# Patient Record
Sex: FEMALE | Race: WHITE | NOT HISPANIC OR LATINO | URBAN - METROPOLITAN AREA
[De-identification: names, ages, dates, MRNs, and addresses within clinical notes are randomized per-mention and may not be internally consistent; named-entity substitution may affect disease eponyms.]

---

## 2017-01-13 ENCOUNTER — EMERGENCY (EMERGENCY)
Facility: HOSPITAL | Age: 82
LOS: 1 days | Discharge: HOME | End: 2017-01-13
Attending: EMERGENCY MEDICINE | Admitting: EMERGENCY MEDICINE
Payer: MEDICARE

## 2017-01-13 VITALS
HEART RATE: 79 BPM | OXYGEN SATURATION: 99 % | RESPIRATION RATE: 15 BRPM | TEMPERATURE: 98 F | DIASTOLIC BLOOD PRESSURE: 77 MMHG | SYSTOLIC BLOOD PRESSURE: 160 MMHG

## 2017-01-13 LAB
ALBUMIN SERPL ELPH-MCNC: 3.5 G/DL — SIGNIFICANT CHANGE UP (ref 3.3–5)
ALP SERPL-CCNC: 183 U/L — HIGH (ref 40–120)
ALT FLD-CCNC: 17 U/L — SIGNIFICANT CHANGE UP (ref 4–33)
APTT BLD: 31.4 SEC — SIGNIFICANT CHANGE UP (ref 27.5–37.4)
AST SERPL-CCNC: 17 U/L — SIGNIFICANT CHANGE UP (ref 4–32)
BASOPHILS # BLD AUTO: 0.03 K/UL — SIGNIFICANT CHANGE UP (ref 0–0.2)
BASOPHILS NFR BLD AUTO: 0.3 % — SIGNIFICANT CHANGE UP (ref 0–2)
BILIRUB SERPL-MCNC: 0.6 MG/DL — SIGNIFICANT CHANGE UP (ref 0.2–1.2)
BLD GP AB SCN SERPL QL: NEGATIVE — SIGNIFICANT CHANGE UP
BUN SERPL-MCNC: 24 MG/DL — HIGH (ref 7–23)
CALCIUM SERPL-MCNC: 9.2 MG/DL — SIGNIFICANT CHANGE UP (ref 8.4–10.5)
CHLORIDE SERPL-SCNC: 104 MMOL/L — SIGNIFICANT CHANGE UP (ref 98–107)
CO2 SERPL-SCNC: 25 MMOL/L — SIGNIFICANT CHANGE UP (ref 22–31)
CREAT SERPL-MCNC: 0.48 MG/DL — LOW (ref 0.5–1.3)
EOSINOPHIL # BLD AUTO: 0.12 K/UL — SIGNIFICANT CHANGE UP (ref 0–0.5)
EOSINOPHIL NFR BLD AUTO: 1.3 % — SIGNIFICANT CHANGE UP (ref 0–6)
GLUCOSE SERPL-MCNC: 90 MG/DL — SIGNIFICANT CHANGE UP (ref 70–99)
HCT VFR BLD CALC: 37.8 % — SIGNIFICANT CHANGE UP (ref 34.5–45)
HGB BLD-MCNC: 12.5 G/DL — SIGNIFICANT CHANGE UP (ref 11.5–15.5)
IMM GRANULOCYTES NFR BLD AUTO: 0.2 % — SIGNIFICANT CHANGE UP (ref 0–1.5)
INR BLD: 1.1 — SIGNIFICANT CHANGE UP (ref 0.87–1.18)
LYMPHOCYTES # BLD AUTO: 2.19 K/UL — SIGNIFICANT CHANGE UP (ref 1–3.3)
LYMPHOCYTES # BLD AUTO: 22.8 % — SIGNIFICANT CHANGE UP (ref 13–44)
MCHC RBC-ENTMCNC: 30.9 PG — SIGNIFICANT CHANGE UP (ref 27–34)
MCHC RBC-ENTMCNC: 33.1 % — SIGNIFICANT CHANGE UP (ref 32–36)
MCV RBC AUTO: 93.3 FL — SIGNIFICANT CHANGE UP (ref 80–100)
MONOCYTES # BLD AUTO: 0.74 K/UL — SIGNIFICANT CHANGE UP (ref 0–0.9)
MONOCYTES NFR BLD AUTO: 7.7 % — SIGNIFICANT CHANGE UP (ref 2–14)
NEUTROPHILS # BLD AUTO: 6.49 K/UL — SIGNIFICANT CHANGE UP (ref 1.8–7.4)
NEUTROPHILS NFR BLD AUTO: 67.7 % — SIGNIFICANT CHANGE UP (ref 43–77)
PLATELET # BLD AUTO: 368 K/UL — SIGNIFICANT CHANGE UP (ref 150–400)
PMV BLD: 10.1 FL — SIGNIFICANT CHANGE UP (ref 7–13)
POTASSIUM SERPL-MCNC: 3.8 MMOL/L — SIGNIFICANT CHANGE UP (ref 3.5–5.3)
POTASSIUM SERPL-SCNC: 3.8 MMOL/L — SIGNIFICANT CHANGE UP (ref 3.5–5.3)
PROT SERPL-MCNC: 6.2 G/DL — SIGNIFICANT CHANGE UP (ref 6–8.3)
PROTHROM AB SERPL-ACNC: 12.5 SEC — SIGNIFICANT CHANGE UP (ref 10–13.1)
RBC # BLD: 4.05 M/UL — SIGNIFICANT CHANGE UP (ref 3.8–5.2)
RBC # FLD: 13.8 % — SIGNIFICANT CHANGE UP (ref 10.3–14.5)
RH IG SCN BLD-IMP: POSITIVE — SIGNIFICANT CHANGE UP
SODIUM SERPL-SCNC: 144 MMOL/L — SIGNIFICANT CHANGE UP (ref 135–145)
WBC # BLD: 9.59 K/UL — SIGNIFICANT CHANGE UP (ref 3.8–10.5)
WBC # FLD AUTO: 9.59 K/UL — SIGNIFICANT CHANGE UP (ref 3.8–10.5)

## 2017-01-13 PROCEDURE — 71020: CPT | Mod: 26

## 2017-01-13 PROCEDURE — 73552 X-RAY EXAM OF FEMUR 2/>: CPT | Mod: 26,RT

## 2017-01-13 PROCEDURE — 73522 X-RAY EXAM HIPS BI 3-4 VIEWS: CPT | Mod: 26

## 2017-01-13 PROCEDURE — 99285 EMERGENCY DEPT VISIT HI MDM: CPT | Mod: GC

## 2017-01-13 RX ORDER — ONDANSETRON 8 MG/1
4 TABLET, FILM COATED ORAL ONCE
Qty: 0 | Refills: 0 | Status: COMPLETED | OUTPATIENT
Start: 2017-01-13 | End: 2017-01-13

## 2017-01-13 RX ORDER — MORPHINE SULFATE 50 MG/1
2 CAPSULE, EXTENDED RELEASE ORAL ONCE
Qty: 0 | Refills: 0 | Status: DISCONTINUED | OUTPATIENT
Start: 2017-01-13 | End: 2017-01-13

## 2017-01-13 RX ADMIN — MORPHINE SULFATE 2 MILLIGRAM(S): 50 CAPSULE, EXTENDED RELEASE ORAL at 21:49

## 2017-01-13 RX ADMIN — MORPHINE SULFATE 2 MILLIGRAM(S): 50 CAPSULE, EXTENDED RELEASE ORAL at 22:05

## 2017-01-13 NOTE — ED PROVIDER NOTE - OBJECTIVE STATEMENT
89 y/o female w/ hx of dementia presenting from [facility] rehab due to hip pain. Pt is confused and cannot respond to questions or follow commands. Per records sent with patient and discussion with nurse at [facility] (last name Eron) pt had no acute fall or injury and just started complaining of hip pain. An x-ray was done as a result of this pain on 1/12/17 and found a R. femoral neck fracture. Pt was thus sent here for further evaluation. 89 y/o female w/ hx of dementia presenting from [facility] rehab due to hip pain. Pt is confused and cannot respond to questions or follow commands. Per records sent with patient and discussion with nurse at [facility] (last name Eron) pt had no acute fall or injury and just started complaining of hip pain. An x-ray was done as a result of this pain on 1/12/17 and found a R. femoral neck fracture. Pt was thus sent here for further evaluation. [place] nurse confirmed that patient is confused at baseline and that patient was already non-ambulatory before her complaints of pain.

## 2017-01-13 NOTE — ED PROVIDER NOTE - ATTENDING CONTRIBUTION TO CARE
Dr. Castillo: I have personally seen and examined this patient at the bedside. I have fully participated in the care of this patient. I have reviewed all pertinent clinical information, including history, physical exam, plan and the Resident's note and agree except as noted. 89F hx baseline wheelchair bound, htn, glaucoma, sleep disorder, dementia w behavioral disturbance BIBEMS from [facility] [place] suspect hip fracture. During bed check patient had rt hip pain. XR "non union supero laterally displaced subcapital right femoral neck fracture. No other fractures or dislocation. OA both hips. Incidentally noted degenerative disc lumbar spine." Unknown etiology of fracture. Dr. Castillo: I have personally seen and examined this patient at the bedside. I have fully participated in the care of this patient. I have reviewed all pertinent clinical information, including history, physical exam, plan and the Resident's note and agree except as noted. 89F hx baseline wheelchair bound, htn, glaucoma, sleep disorder, dementia w behavioral disturbance BIBEMS from [facility] suspect hip fracture. During bed check patient had rt hip pain. XR "non union supero laterally displaced subcapital right femoral neck fracture. No other fractures or dislocation. OA both hips. Incidentally noted degenerative disc lumbar spine." Unknown etiology of fracture. PE nad, aaox0 (continuous speech alternating multiple languages), ncat neg head or facial trauma, neg hemotympanum, perrl, ctabl, rrr, s1s2, abd soft ntnd, rt hip ttp rt femur and rt knee retract with palpation rle distal pulse intact, lt hip nontender lle non tender lt pulse intact. PLAN ekg cxr xrays rt hip rt femur rt knee, morphine 2 mg ivp premedication for xray, ortho consult suspected rt femoral neck fracture in baseline ambulatory with wheelchair patient

## 2017-01-13 NOTE — ED ADULT TRIAGE NOTE - CHIEF COMPLAINT QUOTE
Pt BIBEMS from [facility] for r/o Hip Fracture/displacement. As per EMS it was unclear if she had a pervious fracture and re-injuried the area.  pmhx Dementia, Glaucoma , pain disorder.

## 2017-01-13 NOTE — ED PROVIDER NOTE - PROGRESS NOTE DETAILS
Pt did not cooperate with necessary positioning for x-rays. Will get CT of pelvis and lower extremities instead. Ortho has seen pt. Believe R. femoral neck fx is old. Currently discussing with attending as to whether or not surgery is warranted. TEO LOPEZ MD: As per ortho patient can be d/c'ed back to SNF and f/u outpt.  Will attempt to contact SNF for return or admit to the hospital for re-placement. TEO LOPEZ MD: S/O from Dr. Castillo.   F/U CT and ortho cx.   As per ortho patient can be d/c'ed back to SNF and f/u outpt.  Will attempt to contact SNF for return or admit to the hospital for re-placement. Discussed with ortho further about pt's rehab. They said pt can be weight bearing as tolerated, but her abilities may be limited given the nature of the fracture. Dr. Lopez was the attending overnight that looked over the patient's case, and the pt may follow up with him in clinic next week for further management discussion. Patient reassessed after call from radiology about possible atelectasis vs infection on cxr. Patient asymptomatic, denies cough, cp or sob. O2 sat 99% on room air. RR normal. lungs clear to auscultation. Clinically no concern for pneumonia. -DL

## 2017-01-13 NOTE — ED PROVIDER NOTE - MEDICAL DECISION MAKING DETAILS
89 y/o w/ hx of dementia presenting w/ R. femoral neck fx.  - Hip x-ray to confirm findings written on report sent with pt.  - Ortho consult once x-ray is complete to determine whether or not further intervention is required.

## 2017-01-13 NOTE — ED ADULT NURSE NOTE - CHIEF COMPLAINT QUOTE
Pt BIBEMS from [facility] for r/o Hip Fracture/displacement. As per EMS it was unclear if she had a pervious fracture and re-injuried the area.  pmhx Dementia, Glaucoma , pain disorder.

## 2017-01-14 VITALS
TEMPERATURE: 98 F | HEART RATE: 84 BPM | DIASTOLIC BLOOD PRESSURE: 70 MMHG | OXYGEN SATURATION: 99 % | SYSTOLIC BLOOD PRESSURE: 148 MMHG | RESPIRATION RATE: 18 BRPM

## 2017-01-14 PROCEDURE — 76376 3D RENDER W/INTRP POSTPROCES: CPT | Mod: 26

## 2017-01-14 PROCEDURE — 73700 CT LOWER EXTREMITY W/O DYE: CPT | Mod: 26,50

## 2017-01-14 PROCEDURE — 72192 CT PELVIS W/O DYE: CPT | Mod: 26

## 2017-01-14 RX ADMIN — ONDANSETRON 4 MILLIGRAM(S): 8 TABLET, FILM COATED ORAL at 00:03

## 2017-01-14 NOTE — ED ADULT NURSE REASSESSMENT NOTE - NS ED NURSE REASSESS COMMENT FT1
pt evaluated by residents and attending/ pt to return to nursing home. arrangements being made by / pt very verbal in foreign language at times/ vss

## 2017-01-18 NOTE — PROVIDER CONTACT NOTE (OTHER) - ASSESSMENT
SW contacted by MD, Pt with Dementia requires ambulance assistance to return to [facility] [phone] SW spoke with RN supervisor Ms. Laird states Pt may return. SW contacted [facility] spoke with Ben who provided confirmation for ambulance by [facility]am . Medical staff are aware and in agreement with plan. No further SW intervention required at this time. Ambulance form in chart for transport.
swk received call from son at [facility] regarding needing info for pt to begin p/t at facility again.  d/c summary resent to snf at F# [phone].

## 2017-01-23 ENCOUNTER — RESULT REVIEW (OUTPATIENT)
Age: 82
End: 2017-01-23

## 2017-01-23 ENCOUNTER — INPATIENT (INPATIENT)
Facility: HOSPITAL | Age: 82
LOS: 6 days | Discharge: NURSING, REHAB OR LONG-TERM CARE | DRG: 470 | End: 2017-01-30
Attending: INTERNAL MEDICINE | Admitting: INTERNAL MEDICINE
Payer: MEDICARE

## 2017-01-23 VITALS
RESPIRATION RATE: 18 BRPM | OXYGEN SATURATION: 95 % | SYSTOLIC BLOOD PRESSURE: 117 MMHG | DIASTOLIC BLOOD PRESSURE: 76 MMHG | TEMPERATURE: 98 F | HEART RATE: 88 BPM | HEIGHT: 62 IN | WEIGHT: 110.01 LBS

## 2017-01-23 DIAGNOSIS — F03.90 UNSPECIFIED DEMENTIA, UNSPECIFIED SEVERITY, WITHOUT BEHAVIORAL DISTURBANCE, PSYCHOTIC DISTURBANCE, MOOD DISTURBANCE, AND ANXIETY: ICD-10-CM

## 2017-01-23 DIAGNOSIS — S72.009A FRACTURE OF UNSPECIFIED PART OF NECK OF UNSPECIFIED FEMUR, INITIAL ENCOUNTER FOR CLOSED FRACTURE: ICD-10-CM

## 2017-01-23 DIAGNOSIS — Z29.9 ENCOUNTER FOR PROPHYLACTIC MEASURES, UNSPECIFIED: ICD-10-CM

## 2017-01-23 LAB
ALBUMIN SERPL ELPH-MCNC: 3.1 G/DL — LOW (ref 3.5–5)
ALP SERPL-CCNC: 223 U/L — HIGH (ref 40–120)
ALT FLD-CCNC: 30 U/L DA — SIGNIFICANT CHANGE UP (ref 10–60)
ANION GAP SERPL CALC-SCNC: 7 MMOL/L — SIGNIFICANT CHANGE UP (ref 5–17)
APTT BLD: 28.3 SEC — SIGNIFICANT CHANGE UP (ref 27.5–37.4)
AST SERPL-CCNC: 16 U/L — SIGNIFICANT CHANGE UP (ref 10–40)
BASOPHILS # BLD AUTO: 0.1 K/UL — SIGNIFICANT CHANGE UP (ref 0–0.2)
BASOPHILS NFR BLD AUTO: 0.7 % — SIGNIFICANT CHANGE UP (ref 0–2)
BILIRUB SERPL-MCNC: 0.4 MG/DL — SIGNIFICANT CHANGE UP (ref 0.2–1.2)
BUN SERPL-MCNC: 31 MG/DL — HIGH (ref 7–18)
CALCIUM SERPL-MCNC: 8.6 MG/DL — SIGNIFICANT CHANGE UP (ref 8.4–10.5)
CHLORIDE SERPL-SCNC: 106 MMOL/L — SIGNIFICANT CHANGE UP (ref 96–108)
CO2 SERPL-SCNC: 31 MMOL/L — SIGNIFICANT CHANGE UP (ref 22–31)
CREAT SERPL-MCNC: 0.73 MG/DL — SIGNIFICANT CHANGE UP (ref 0.5–1.3)
EOSINOPHIL # BLD AUTO: 0.2 K/UL — SIGNIFICANT CHANGE UP (ref 0–0.5)
EOSINOPHIL NFR BLD AUTO: 1.6 % — SIGNIFICANT CHANGE UP (ref 0–6)
GLUCOSE SERPL-MCNC: 90 MG/DL — SIGNIFICANT CHANGE UP (ref 70–99)
HCT VFR BLD CALC: 41.1 % — SIGNIFICANT CHANGE UP (ref 34.5–45)
HGB BLD-MCNC: 13.5 G/DL — SIGNIFICANT CHANGE UP (ref 11.5–15.5)
INR BLD: 1.14 RATIO — SIGNIFICANT CHANGE UP (ref 0.88–1.16)
LYMPHOCYTES # BLD AUTO: 17.5 % — SIGNIFICANT CHANGE UP (ref 13–44)
LYMPHOCYTES # BLD AUTO: 2.1 K/UL — SIGNIFICANT CHANGE UP (ref 1–3.3)
MCHC RBC-ENTMCNC: 31 PG — SIGNIFICANT CHANGE UP (ref 27–34)
MCHC RBC-ENTMCNC: 32.9 GM/DL — SIGNIFICANT CHANGE UP (ref 32–36)
MCV RBC AUTO: 94.1 FL — SIGNIFICANT CHANGE UP (ref 80–100)
MONOCYTES # BLD AUTO: 0.9 K/UL — SIGNIFICANT CHANGE UP (ref 0–0.9)
MONOCYTES NFR BLD AUTO: 7.3 % — SIGNIFICANT CHANGE UP (ref 2–14)
NEUTROPHILS # BLD AUTO: 8.6 K/UL — HIGH (ref 1.8–7.4)
NEUTROPHILS NFR BLD AUTO: 73 % — SIGNIFICANT CHANGE UP (ref 43–77)
PLATELET # BLD AUTO: 396 K/UL — SIGNIFICANT CHANGE UP (ref 150–400)
POTASSIUM SERPL-MCNC: 4.6 MMOL/L — SIGNIFICANT CHANGE UP (ref 3.5–5.3)
POTASSIUM SERPL-SCNC: 4.6 MMOL/L — SIGNIFICANT CHANGE UP (ref 3.5–5.3)
PROT SERPL-MCNC: 6.8 G/DL — SIGNIFICANT CHANGE UP (ref 6–8.3)
PROTHROM AB SERPL-ACNC: 12.8 SEC — SIGNIFICANT CHANGE UP (ref 10–13.1)
RBC # BLD: 4.37 M/UL — SIGNIFICANT CHANGE UP (ref 3.8–5.2)
RBC # FLD: 12.4 % — SIGNIFICANT CHANGE UP (ref 10.3–14.5)
SODIUM SERPL-SCNC: 144 MMOL/L — SIGNIFICANT CHANGE UP (ref 135–145)
WBC # BLD: 11.8 K/UL — HIGH (ref 3.8–10.5)
WBC # FLD AUTO: 11.8 K/UL — HIGH (ref 3.8–10.5)

## 2017-01-23 PROCEDURE — 70450 CT HEAD/BRAIN W/O DYE: CPT | Mod: 26

## 2017-01-23 PROCEDURE — 73522 X-RAY EXAM HIPS BI 3-4 VIEWS: CPT | Mod: 26

## 2017-01-23 PROCEDURE — 71010: CPT | Mod: 26

## 2017-01-23 PROCEDURE — 99285 EMERGENCY DEPT VISIT HI MDM: CPT

## 2017-01-23 RX ORDER — ACETAMINOPHEN 500 MG
650 TABLET ORAL ONCE
Qty: 0 | Refills: 0 | Status: DISCONTINUED | OUTPATIENT
Start: 2017-01-23 | End: 2017-01-23

## 2017-01-23 RX ORDER — LATANOPROST 0.05 MG/ML
1 SOLUTION/ DROPS OPHTHALMIC; TOPICAL AT BEDTIME
Qty: 0 | Refills: 0 | Status: DISCONTINUED | OUTPATIENT
Start: 2017-01-23 | End: 2017-01-24

## 2017-01-23 RX ORDER — ACETAMINOPHEN 500 MG
650 TABLET ORAL EVERY 6 HOURS
Qty: 0 | Refills: 0 | Status: DISCONTINUED | OUTPATIENT
Start: 2017-01-23 | End: 2017-01-24

## 2017-01-23 RX ORDER — MIRTAZAPINE 45 MG/1
15 TABLET, ORALLY DISINTEGRATING ORAL AT BEDTIME
Qty: 0 | Refills: 0 | Status: DISCONTINUED | OUTPATIENT
Start: 2017-01-23 | End: 2017-01-24

## 2017-01-23 RX ORDER — HEPARIN SODIUM 5000 [USP'U]/ML
5000 INJECTION INTRAVENOUS; SUBCUTANEOUS EVERY 12 HOURS
Qty: 0 | Refills: 0 | Status: DISCONTINUED | OUTPATIENT
Start: 2017-01-23 | End: 2017-01-24

## 2017-01-23 RX ORDER — DIVALPROEX SODIUM 500 MG/1
125 TABLET, DELAYED RELEASE ORAL
Qty: 0 | Refills: 0 | Status: DISCONTINUED | OUTPATIENT
Start: 2017-01-23 | End: 2017-01-24

## 2017-01-23 RX ORDER — DIVALPROEX SODIUM 500 MG/1
125 TABLET, DELAYED RELEASE ORAL
Qty: 0 | Refills: 0 | Status: DISCONTINUED | OUTPATIENT
Start: 2017-01-23 | End: 2017-01-23

## 2017-01-23 RX ORDER — MEMANTINE HYDROCHLORIDE 10 MG/1
1 TABLET ORAL
Qty: 0 | Refills: 0 | COMMUNITY

## 2017-01-23 RX ORDER — PANTOPRAZOLE SODIUM 20 MG/1
40 TABLET, DELAYED RELEASE ORAL
Qty: 0 | Refills: 0 | Status: DISCONTINUED | OUTPATIENT
Start: 2017-01-23 | End: 2017-01-24

## 2017-01-23 RX ORDER — SODIUM CHLORIDE 9 MG/ML
1000 INJECTION, SOLUTION INTRAVENOUS
Qty: 0 | Refills: 0 | Status: DISCONTINUED | OUTPATIENT
Start: 2017-01-23 | End: 2017-01-24

## 2017-01-23 RX ADMIN — DIVALPROEX SODIUM 125 MILLIGRAM(S): 500 TABLET, DELAYED RELEASE ORAL at 21:58

## 2017-01-23 RX ADMIN — PANTOPRAZOLE SODIUM 40 MILLIGRAM(S): 20 TABLET, DELAYED RELEASE ORAL at 23:06

## 2017-01-23 RX ADMIN — HEPARIN SODIUM 5000 UNIT(S): 5000 INJECTION INTRAVENOUS; SUBCUTANEOUS at 21:58

## 2017-01-23 RX ADMIN — MIRTAZAPINE 15 MILLIGRAM(S): 45 TABLET, ORALLY DISINTEGRATING ORAL at 22:00

## 2017-01-23 RX ADMIN — SODIUM CHLORIDE 60 MILLILITER(S): 9 INJECTION, SOLUTION INTRAVENOUS at 23:06

## 2017-01-23 RX ADMIN — LATANOPROST 1 DROP(S): 0.05 SOLUTION/ DROPS OPHTHALMIC; TOPICAL at 21:58

## 2017-01-23 NOTE — ED PROVIDER NOTE - OBJECTIVE STATEMENT
89 y/o female, PMHx dementia, osteoporosis, sent by Dr. Uriostegui (orthopedic) to admit under Dr. Perez for surgery. Pt A& 89 y/o female, PMHx dementia, osteoporosis, sent by Dr. Uriostegui (orthopedic's) to admit under Dr. Perez for surgery. Pt A&Ox2. Was last at [facility] 1/14-18/17 for R hip fracture, sent to [facility]. 89 y/o female, PMHx dementia, osteoporosis, sent by Dr. Uriostegui (orthopedic's) to admit under Dr. Perez for hip surgery. Pt A&Ox2. Was last at [facility] 1/14-18/17 for R hip fracture, was sent to [facility].

## 2017-01-23 NOTE — H&P ADULT. - ASSESSMENT
89 year old female from [facility] with pmh of dementia, depression and anxiety was brought to the ED because of R hip pain. Patient was in ED on 13 Jan and was diagnosed with R hip fracture but she was discharged to [facility] with surgical intervention. Patient had persistent hip pain and Dr manual was consulted again. he accepted the patient for Right nguyễn arthroplasty.   Patient denies sob, nausea, vomiting, diarrhea, headache, constipation or diarrhea.    In Ed, patient patient was lying in bed, not in acute distress, /78, Hr 77. Ortho Dr Young already assessed the patient and note says R hemiarthroplasty in AM and patient is NPO after midnight. 3D CT pelvis showed Subacute to chronic displaced fracture of the right femoral neck with surrounding heterotopic bone formation. Healed fracture of the right patella status post fixation. CT head -ve for acute bleed

## 2017-01-23 NOTE — ED PROVIDER NOTE - ATTENDING CONTRIBUTION TO CARE
patient sent for operative repair of worsening displacement of right hip fracture. On exam no visible leg shortening. tenderness to palpation right hip. patient with dementia, unable to provide history.

## 2017-01-23 NOTE — H&P ADULT. - HISTORY OF PRESENT ILLNESS
89 year old female from [facility] with pmh of dementia, depression and anxiety was brought to the ED because of R hip pain. Patient was in ED on 13 Jan and was diagnosed with R hip fracture but she was discharged to [facility] with surgical intervention. Patient had persistent hip pain and Dr manual was consulted again. he accepted the patient for Right nguyễn arthroplasty.   Patient denies sob, nausea, vomiting, diarrhea, headache, constipation or diarrhea.    In Ed, patient patient was lying in bed, not in acute distress, /78, Hr 77. Ortho Dr Young already assessed the patient and note says R hemiarthroplasty in AM and patient is NPO after midnight. 3D CT pelvis showed Subacute to chronic displaced fracture of the right femoral neck with surrounding heterotopic bone formation. CT head -ve for acute bleed    Healed fracture of the right patella status post fixation.  Denies smoking, alchohol, or illicit drug use.

## 2017-01-23 NOTE — H&P ADULT. - NEUROLOGICAL DETAILS
responds to pain/normal strength/alert and oriented x 3/responds to verbal commands/sensation intact/deep reflexes intact

## 2017-01-23 NOTE — ED PROVIDER NOTE - MEDICAL DECISION MAKING DETAILS
89 y/o female sent from ortho for surgery, Spoke with Dr. Uriostegui, will admit to [facility] contacted via Dr. Uriostegui and myself for consult. Pt A&0x2, no acute distress. CT head/Troponin ordered, unknown mechanism of worsening fx or fall,

## 2017-01-23 NOTE — ED PROVIDER NOTE - CARE PLAN
Principal Discharge DX:	Hip fracture  Secondary Diagnosis:	Dementia with behavioral disturbance, unspecified dementia type

## 2017-01-24 LAB
ANION GAP SERPL CALC-SCNC: 8 MMOL/L — SIGNIFICANT CHANGE UP (ref 5–17)
ANION GAP SERPL CALC-SCNC: 9 MMOL/L — SIGNIFICANT CHANGE UP (ref 5–17)
BASOPHILS # BLD AUTO: 0.1 K/UL — SIGNIFICANT CHANGE UP (ref 0–0.2)
BASOPHILS NFR BLD AUTO: 1.2 % — SIGNIFICANT CHANGE UP (ref 0–2)
BUN SERPL-MCNC: 16 MG/DL — SIGNIFICANT CHANGE UP (ref 7–18)
BUN SERPL-MCNC: 23 MG/DL — HIGH (ref 7–18)
CALCIUM SERPL-MCNC: 8 MG/DL — LOW (ref 8.4–10.5)
CALCIUM SERPL-MCNC: 8.4 MG/DL — SIGNIFICANT CHANGE UP (ref 8.4–10.5)
CHLORIDE SERPL-SCNC: 109 MMOL/L — HIGH (ref 96–108)
CHLORIDE SERPL-SCNC: 112 MMOL/L — HIGH (ref 96–108)
CHOLEST SERPL-MCNC: 172 MG/DL — SIGNIFICANT CHANGE UP (ref 10–199)
CO2 SERPL-SCNC: 25 MMOL/L — SIGNIFICANT CHANGE UP (ref 22–31)
CO2 SERPL-SCNC: 29 MMOL/L — SIGNIFICANT CHANGE UP (ref 22–31)
CREAT SERPL-MCNC: 0.5 MG/DL — SIGNIFICANT CHANGE UP (ref 0.5–1.3)
CREAT SERPL-MCNC: 0.59 MG/DL — SIGNIFICANT CHANGE UP (ref 0.5–1.3)
EOSINOPHIL # BLD AUTO: 0.2 K/UL — SIGNIFICANT CHANGE UP (ref 0–0.5)
EOSINOPHIL NFR BLD AUTO: 1.9 % — SIGNIFICANT CHANGE UP (ref 0–6)
GLUCOSE SERPL-MCNC: 137 MG/DL — HIGH (ref 70–99)
GLUCOSE SERPL-MCNC: 89 MG/DL — SIGNIFICANT CHANGE UP (ref 70–99)
HBA1C BLD-MCNC: 5.4 % — SIGNIFICANT CHANGE UP (ref 4–5.6)
HCT VFR BLD CALC: 37.4 % — SIGNIFICANT CHANGE UP (ref 34.5–45)
HCT VFR BLD CALC: 38.6 % — SIGNIFICANT CHANGE UP (ref 34.5–45)
HDLC SERPL-MCNC: 44 MG/DL — SIGNIFICANT CHANGE UP (ref 40–125)
HGB BLD-MCNC: 12.4 G/DL — SIGNIFICANT CHANGE UP (ref 11.5–15.5)
HGB BLD-MCNC: 12.7 G/DL — SIGNIFICANT CHANGE UP (ref 11.5–15.5)
INR BLD: 1.15 RATIO — SIGNIFICANT CHANGE UP (ref 0.88–1.16)
LIPID PNL WITH DIRECT LDL SERPL: 107 MG/DL — SIGNIFICANT CHANGE UP
LYMPHOCYTES # BLD AUTO: 2.3 K/UL — SIGNIFICANT CHANGE UP (ref 1–3.3)
LYMPHOCYTES # BLD AUTO: 20.7 % — SIGNIFICANT CHANGE UP (ref 13–44)
MAGNESIUM SERPL-MCNC: 2.4 MG/DL — SIGNIFICANT CHANGE UP (ref 1.8–2.4)
MCHC RBC-ENTMCNC: 30.8 PG — SIGNIFICANT CHANGE UP (ref 27–34)
MCHC RBC-ENTMCNC: 31 PG — SIGNIFICANT CHANGE UP (ref 27–34)
MCHC RBC-ENTMCNC: 32.9 GM/DL — SIGNIFICANT CHANGE UP (ref 32–36)
MCHC RBC-ENTMCNC: 33.1 GM/DL — SIGNIFICANT CHANGE UP (ref 32–36)
MCV RBC AUTO: 93.1 FL — SIGNIFICANT CHANGE UP (ref 80–100)
MCV RBC AUTO: 94.2 FL — SIGNIFICANT CHANGE UP (ref 80–100)
MONOCYTES # BLD AUTO: 1.1 K/UL — HIGH (ref 0–0.9)
MONOCYTES NFR BLD AUTO: 9.3 % — SIGNIFICANT CHANGE UP (ref 2–14)
NEUTROPHILS # BLD AUTO: 7.6 K/UL — HIGH (ref 1.8–7.4)
NEUTROPHILS NFR BLD AUTO: 66.9 % — SIGNIFICANT CHANGE UP (ref 43–77)
PHOSPHATE SERPL-MCNC: 3 MG/DL — SIGNIFICANT CHANGE UP (ref 2.5–4.5)
PLATELET # BLD AUTO: 338 K/UL — SIGNIFICANT CHANGE UP (ref 150–400)
PLATELET # BLD AUTO: 394 K/UL — SIGNIFICANT CHANGE UP (ref 150–400)
POTASSIUM SERPL-MCNC: 3.5 MMOL/L — SIGNIFICANT CHANGE UP (ref 3.5–5.3)
POTASSIUM SERPL-MCNC: 3.6 MMOL/L — SIGNIFICANT CHANGE UP (ref 3.5–5.3)
POTASSIUM SERPL-SCNC: 3.5 MMOL/L — SIGNIFICANT CHANGE UP (ref 3.5–5.3)
POTASSIUM SERPL-SCNC: 3.6 MMOL/L — SIGNIFICANT CHANGE UP (ref 3.5–5.3)
PROTHROM AB SERPL-ACNC: 12.9 SEC — SIGNIFICANT CHANGE UP (ref 10–13.1)
RBC # BLD: 4.02 M/UL — SIGNIFICANT CHANGE UP (ref 3.8–5.2)
RBC # BLD: 4.1 M/UL — SIGNIFICANT CHANGE UP (ref 3.8–5.2)
RBC # FLD: 12.1 % — SIGNIFICANT CHANGE UP (ref 10.3–14.5)
RBC # FLD: 12.3 % — SIGNIFICANT CHANGE UP (ref 10.3–14.5)
SODIUM SERPL-SCNC: 145 MMOL/L — SIGNIFICANT CHANGE UP (ref 135–145)
SODIUM SERPL-SCNC: 147 MMOL/L — HIGH (ref 135–145)
TOTAL CHOLESTEROL/HDL RATIO MEASUREMENT: 3.9 RATIO — SIGNIFICANT CHANGE UP (ref 3.3–7.1)
TRIGL SERPL-MCNC: 106 MG/DL — SIGNIFICANT CHANGE UP (ref 10–149)
TSH SERPL-MCNC: 1.83 UU/ML — SIGNIFICANT CHANGE UP (ref 0.34–4.82)
WBC # BLD: 11.4 K/UL — HIGH (ref 3.8–10.5)
WBC # BLD: 17.9 K/UL — HIGH (ref 3.8–10.5)
WBC # FLD AUTO: 11.4 K/UL — HIGH (ref 3.8–10.5)
WBC # FLD AUTO: 17.9 K/UL — HIGH (ref 3.8–10.5)

## 2017-01-24 PROCEDURE — 73502 X-RAY EXAM HIP UNI 2-3 VIEWS: CPT | Mod: 26,RT

## 2017-01-24 PROCEDURE — 88311 DECALCIFY TISSUE: CPT | Mod: 26

## 2017-01-24 PROCEDURE — 27236 TREAT THIGH FRACTURE: CPT | Mod: AS,RT

## 2017-01-24 PROCEDURE — 88305 TISSUE EXAM BY PATHOLOGIST: CPT | Mod: 26

## 2017-01-24 RX ORDER — HEPARIN SODIUM 5000 [USP'U]/ML
5000 INJECTION INTRAVENOUS; SUBCUTANEOUS EVERY 12 HOURS
Qty: 0 | Refills: 0 | Status: DISCONTINUED | OUTPATIENT
Start: 2017-01-24 | End: 2017-01-30

## 2017-01-24 RX ORDER — HYDROMORPHONE HYDROCHLORIDE 2 MG/ML
0.25 INJECTION INTRAMUSCULAR; INTRAVENOUS; SUBCUTANEOUS
Qty: 0 | Refills: 0 | Status: DISCONTINUED | OUTPATIENT
Start: 2017-01-24 | End: 2017-01-24

## 2017-01-24 RX ORDER — MIRTAZAPINE 45 MG/1
15 TABLET, ORALLY DISINTEGRATING ORAL AT BEDTIME
Qty: 0 | Refills: 0 | Status: DISCONTINUED | OUTPATIENT
Start: 2017-01-24 | End: 2017-01-27

## 2017-01-24 RX ORDER — SODIUM CHLORIDE 9 MG/ML
1000 INJECTION, SOLUTION INTRAVENOUS
Qty: 0 | Refills: 0 | Status: DISCONTINUED | OUTPATIENT
Start: 2017-01-24 | End: 2017-01-24

## 2017-01-24 RX ORDER — DOCUSATE SODIUM 100 MG
100 CAPSULE ORAL THREE TIMES A DAY
Qty: 0 | Refills: 0 | Status: DISCONTINUED | OUTPATIENT
Start: 2017-01-24 | End: 2017-01-30

## 2017-01-24 RX ORDER — SENNA PLUS 8.6 MG/1
2 TABLET ORAL AT BEDTIME
Qty: 0 | Refills: 0 | Status: DISCONTINUED | OUTPATIENT
Start: 2017-01-24 | End: 2017-01-30

## 2017-01-24 RX ORDER — DIVALPROEX SODIUM 500 MG/1
125 TABLET, DELAYED RELEASE ORAL
Qty: 0 | Refills: 0 | Status: DISCONTINUED | OUTPATIENT
Start: 2017-01-24 | End: 2017-01-27

## 2017-01-24 RX ORDER — PANTOPRAZOLE SODIUM 20 MG/1
40 TABLET, DELAYED RELEASE ORAL
Qty: 0 | Refills: 0 | Status: DISCONTINUED | OUTPATIENT
Start: 2017-01-24 | End: 2017-01-30

## 2017-01-24 RX ORDER — ONDANSETRON 8 MG/1
4 TABLET, FILM COATED ORAL EVERY 6 HOURS
Qty: 0 | Refills: 0 | Status: DISCONTINUED | OUTPATIENT
Start: 2017-01-24 | End: 2017-01-30

## 2017-01-24 RX ORDER — LATANOPROST 0.05 MG/ML
1 SOLUTION/ DROPS OPHTHALMIC; TOPICAL AT BEDTIME
Qty: 0 | Refills: 0 | Status: DISCONTINUED | OUTPATIENT
Start: 2017-01-24 | End: 2017-01-30

## 2017-01-24 RX ORDER — ACETAMINOPHEN 500 MG
650 TABLET ORAL EVERY 6 HOURS
Qty: 0 | Refills: 0 | Status: DISCONTINUED | OUTPATIENT
Start: 2017-01-24 | End: 2017-01-30

## 2017-01-24 RX ORDER — ONDANSETRON 8 MG/1
4 TABLET, FILM COATED ORAL ONCE
Qty: 0 | Refills: 0 | Status: DISCONTINUED | OUTPATIENT
Start: 2017-01-24 | End: 2017-01-24

## 2017-01-24 RX ORDER — CEFAZOLIN SODIUM 1 G
1000 VIAL (EA) INJECTION EVERY 8 HOURS
Qty: 0 | Refills: 0 | Status: COMPLETED | OUTPATIENT
Start: 2017-01-24 | End: 2017-01-25

## 2017-01-24 RX ORDER — FERROUS SULFATE 325(65) MG
325 TABLET ORAL
Qty: 0 | Refills: 0 | Status: DISCONTINUED | OUTPATIENT
Start: 2017-01-24 | End: 2017-01-30

## 2017-01-24 RX ORDER — FOLIC ACID 0.8 MG
1 TABLET ORAL DAILY
Qty: 0 | Refills: 0 | Status: DISCONTINUED | OUTPATIENT
Start: 2017-01-24 | End: 2017-01-30

## 2017-01-24 RX ADMIN — PANTOPRAZOLE SODIUM 40 MILLIGRAM(S): 20 TABLET, DELAYED RELEASE ORAL at 05:55

## 2017-01-24 RX ADMIN — Medication 100 MILLIGRAM(S): at 23:10

## 2017-01-24 RX ADMIN — DIVALPROEX SODIUM 125 MILLIGRAM(S): 500 TABLET, DELAYED RELEASE ORAL at 05:55

## 2017-01-24 RX ADMIN — SODIUM CHLORIDE 60 MILLILITER(S): 9 INJECTION, SOLUTION INTRAVENOUS at 06:00

## 2017-01-25 LAB
ANION GAP SERPL CALC-SCNC: 8 MMOL/L — SIGNIFICANT CHANGE UP (ref 5–17)
BUN SERPL-MCNC: 17 MG/DL — SIGNIFICANT CHANGE UP (ref 7–18)
CALCIUM SERPL-MCNC: 8.3 MG/DL — LOW (ref 8.4–10.5)
CHLORIDE SERPL-SCNC: 110 MMOL/L — HIGH (ref 96–108)
CO2 SERPL-SCNC: 26 MMOL/L — SIGNIFICANT CHANGE UP (ref 22–31)
CREAT SERPL-MCNC: 0.62 MG/DL — SIGNIFICANT CHANGE UP (ref 0.5–1.3)
GLUCOSE SERPL-MCNC: 123 MG/DL — HIGH (ref 70–99)
HCT VFR BLD CALC: 31.7 % — LOW (ref 34.5–45)
HGB BLD-MCNC: 10.9 G/DL — LOW (ref 11.5–15.5)
MCHC RBC-ENTMCNC: 32.2 PG — SIGNIFICANT CHANGE UP (ref 27–34)
MCHC RBC-ENTMCNC: 34.5 GM/DL — SIGNIFICANT CHANGE UP (ref 32–36)
MCV RBC AUTO: 93.3 FL — SIGNIFICANT CHANGE UP (ref 80–100)
PLATELET # BLD AUTO: 338 K/UL — SIGNIFICANT CHANGE UP (ref 150–400)
POTASSIUM SERPL-MCNC: 4 MMOL/L — SIGNIFICANT CHANGE UP (ref 3.5–5.3)
POTASSIUM SERPL-SCNC: 4 MMOL/L — SIGNIFICANT CHANGE UP (ref 3.5–5.3)
RBC # BLD: 3.39 M/UL — LOW (ref 3.8–5.2)
RBC # FLD: 12.2 % — SIGNIFICANT CHANGE UP (ref 10.3–14.5)
SODIUM SERPL-SCNC: 144 MMOL/L — SIGNIFICANT CHANGE UP (ref 135–145)
WBC # BLD: 11.3 K/UL — HIGH (ref 3.8–10.5)
WBC # FLD AUTO: 11.3 K/UL — HIGH (ref 3.8–10.5)

## 2017-01-25 RX ORDER — SODIUM CHLORIDE 9 MG/ML
250 INJECTION INTRAMUSCULAR; INTRAVENOUS; SUBCUTANEOUS ONCE
Qty: 0 | Refills: 0 | Status: COMPLETED | OUTPATIENT
Start: 2017-01-25 | End: 2017-01-25

## 2017-01-25 RX ADMIN — HEPARIN SODIUM 5000 UNIT(S): 5000 INJECTION INTRAVENOUS; SUBCUTANEOUS at 17:20

## 2017-01-25 RX ADMIN — HEPARIN SODIUM 5000 UNIT(S): 5000 INJECTION INTRAVENOUS; SUBCUTANEOUS at 05:28

## 2017-01-25 RX ADMIN — Medication 325 MILLIGRAM(S): at 17:21

## 2017-01-25 RX ADMIN — Medication 100 MILLIGRAM(S): at 06:27

## 2017-01-25 RX ADMIN — SODIUM CHLORIDE 500 MILLILITER(S): 9 INJECTION INTRAMUSCULAR; INTRAVENOUS; SUBCUTANEOUS at 05:51

## 2017-01-25 RX ADMIN — DIVALPROEX SODIUM 125 MILLIGRAM(S): 500 TABLET, DELAYED RELEASE ORAL at 17:22

## 2017-01-25 RX ADMIN — Medication 1 MILLIGRAM(S): at 11:17

## 2017-01-25 NOTE — PHYSICAL THERAPY INITIAL EVALUATION ADULT - GENERAL OBSERVATIONS, REHAB EVAL
Pt. found lying supine connected to IV, abductor pillow, knee immobilizer on right LE, and athrombic pumps. Right hip dressing clean, dry and intact. Pt. is very confused and disoriented; was mumbling few different languages. Pt. unable to follow verbal commands and was restless during eval. Non-verbal indicator of pain present when right LE was moved.

## 2017-01-25 NOTE — PHYSICAL THERAPY INITIAL EVALUATION ADULT - LEVEL OF INDEPENDENCE: SUPINE/SIT, REHAB EVAL
at this time due to pain, cognition (restless and unable to follow verbal commands)/unable to perform

## 2017-01-25 NOTE — PHYSICAL THERAPY INITIAL EVALUATION ADULT - PERTINENT HX OF CURRENT PROBLEM, REHAB EVAL
89 year old female from [facility] [place] with PMH of dementia, depression and anxiety was brought to the ED because of Right hip pain while she was in rehab. X-ray revealed subacute to chronic displaced fracture of the right femoral neck with surrounding heterotropic bone formation.

## 2017-01-25 NOTE — PHYSICAL THERAPY INITIAL EVALUATION ADULT - PASSIVE RANGE OF MOTION EXAMINATION, REHAB EVAL
bilateral upper extremity Passive ROM was WFL (within functional limits)/Right hip and knee unable to assess well due to pain; pt. was guarding. Right hip flex 0-15; right knee flex 0-25 degrees/Left LE Passive ROM was WFL (within functional limits)/deficits as listed below

## 2017-01-25 NOTE — PHYSICAL THERAPY INITIAL EVALUATION ADULT - CRITERIA FOR SKILLED THERAPEUTIC INTERVENTIONS
therapy frequency/impairments found/predicted duration of therapy intervention/rehab potential/anticipated discharge recommendation/functional limitations in following categories/risk reduction/prevention

## 2017-01-25 NOTE — PHYSICAL THERAPY INITIAL EVALUATION ADULT - MANUAL MUSCLE TESTING RESULTS, REHAB EVAL
Unable to assess well due to mental status. Pt. however is moving her carlie. UE's voluntarily and grossly graded 4-/5; left LE 3+/5; right LE unable to assess due to pain

## 2017-01-25 NOTE — PHYSICAL THERAPY INITIAL EVALUATION ADULT - IMPAIRMENTS FOUND, PT EVAL
joint integrity and mobility/cognitive impairment/muscle strength/aerobic capacity/endurance/poor safety awareness/gait, locomotion, and balance/gross motor/arousal, attention, and cognition

## 2017-01-26 LAB
ANION GAP SERPL CALC-SCNC: 9 MMOL/L — SIGNIFICANT CHANGE UP (ref 5–17)
BUN SERPL-MCNC: 15 MG/DL — SIGNIFICANT CHANGE UP (ref 7–18)
CALCIUM SERPL-MCNC: 8.2 MG/DL — LOW (ref 8.4–10.5)
CHLORIDE SERPL-SCNC: 110 MMOL/L — HIGH (ref 96–108)
CO2 SERPL-SCNC: 27 MMOL/L — SIGNIFICANT CHANGE UP (ref 22–31)
CREAT SERPL-MCNC: 0.46 MG/DL — LOW (ref 0.5–1.3)
GLUCOSE SERPL-MCNC: 96 MG/DL — SIGNIFICANT CHANGE UP (ref 70–99)
HCT VFR BLD CALC: 30.2 % — LOW (ref 34.5–45)
HGB BLD-MCNC: 10.1 G/DL — LOW (ref 11.5–15.5)
MCHC RBC-ENTMCNC: 31.3 PG — SIGNIFICANT CHANGE UP (ref 27–34)
MCHC RBC-ENTMCNC: 33.6 GM/DL — SIGNIFICANT CHANGE UP (ref 32–36)
MCV RBC AUTO: 93.2 FL — SIGNIFICANT CHANGE UP (ref 80–100)
PLATELET # BLD AUTO: 318 K/UL — SIGNIFICANT CHANGE UP (ref 150–400)
POTASSIUM SERPL-MCNC: 3.5 MMOL/L — SIGNIFICANT CHANGE UP (ref 3.5–5.3)
POTASSIUM SERPL-SCNC: 3.5 MMOL/L — SIGNIFICANT CHANGE UP (ref 3.5–5.3)
RBC # BLD: 3.24 M/UL — LOW (ref 3.8–5.2)
RBC # FLD: 12.2 % — SIGNIFICANT CHANGE UP (ref 10.3–14.5)
SODIUM SERPL-SCNC: 146 MMOL/L — HIGH (ref 135–145)
WBC # BLD: 14 K/UL — HIGH (ref 3.8–10.5)
WBC # FLD AUTO: 14 K/UL — HIGH (ref 3.8–10.5)

## 2017-01-26 PROCEDURE — 71010: CPT | Mod: 26

## 2017-01-26 RX ORDER — SODIUM CHLORIDE 9 MG/ML
1000 INJECTION, SOLUTION INTRAVENOUS
Qty: 0 | Refills: 0 | Status: DISCONTINUED | OUTPATIENT
Start: 2017-01-26 | End: 2017-01-30

## 2017-01-26 RX ORDER — MORPHINE SULFATE 50 MG/1
0.5 CAPSULE, EXTENDED RELEASE ORAL ONCE
Qty: 0 | Refills: 0 | Status: DISCONTINUED | OUTPATIENT
Start: 2017-01-26 | End: 2017-01-26

## 2017-01-26 RX ADMIN — LATANOPROST 1 DROP(S): 0.05 SOLUTION/ DROPS OPHTHALMIC; TOPICAL at 21:10

## 2017-01-26 RX ADMIN — SODIUM CHLORIDE 75 MILLILITER(S): 9 INJECTION, SOLUTION INTRAVENOUS at 21:09

## 2017-01-26 RX ADMIN — HEPARIN SODIUM 5000 UNIT(S): 5000 INJECTION INTRAVENOUS; SUBCUTANEOUS at 18:15

## 2017-01-26 RX ADMIN — MIRTAZAPINE 15 MILLIGRAM(S): 45 TABLET, ORALLY DISINTEGRATING ORAL at 21:09

## 2017-01-26 RX ADMIN — SENNA PLUS 2 TABLET(S): 8.6 TABLET ORAL at 21:09

## 2017-01-26 RX ADMIN — Medication 325 MILLIGRAM(S): at 08:59

## 2017-01-26 RX ADMIN — MORPHINE SULFATE 0.5 MILLIGRAM(S): 50 CAPSULE, EXTENDED RELEASE ORAL at 03:29

## 2017-01-26 RX ADMIN — MORPHINE SULFATE 0.5 MILLIGRAM(S): 50 CAPSULE, EXTENDED RELEASE ORAL at 04:00

## 2017-01-27 LAB
ANION GAP SERPL CALC-SCNC: 9 MMOL/L — SIGNIFICANT CHANGE UP (ref 5–17)
APPEARANCE UR: CLEAR — SIGNIFICANT CHANGE UP
BASOPHILS # BLD AUTO: 0.1 K/UL — SIGNIFICANT CHANGE UP (ref 0–0.2)
BASOPHILS NFR BLD AUTO: 0.7 % — SIGNIFICANT CHANGE UP (ref 0–2)
BILIRUB UR-MCNC: NEGATIVE — SIGNIFICANT CHANGE UP
BUN SERPL-MCNC: 13 MG/DL — SIGNIFICANT CHANGE UP (ref 7–18)
CALCIUM SERPL-MCNC: 8.1 MG/DL — LOW (ref 8.4–10.5)
CHLORIDE SERPL-SCNC: 108 MMOL/L — SIGNIFICANT CHANGE UP (ref 96–108)
CO2 SERPL-SCNC: 26 MMOL/L — SIGNIFICANT CHANGE UP (ref 22–31)
COLOR SPEC: YELLOW — SIGNIFICANT CHANGE UP
CREAT SERPL-MCNC: 0.43 MG/DL — LOW (ref 0.5–1.3)
DIFF PNL FLD: ABNORMAL
EOSINOPHIL # BLD AUTO: 0 K/UL — SIGNIFICANT CHANGE UP (ref 0–0.5)
EOSINOPHIL NFR BLD AUTO: 0.3 % — SIGNIFICANT CHANGE UP (ref 0–6)
GLUCOSE SERPL-MCNC: 102 MG/DL — HIGH (ref 70–99)
GLUCOSE UR QL: NEGATIVE — SIGNIFICANT CHANGE UP
HCT VFR BLD CALC: 27.2 % — LOW (ref 34.5–45)
HCT VFR BLD CALC: 28.3 % — LOW (ref 34.5–45)
HGB BLD-MCNC: 9.2 G/DL — LOW (ref 11.5–15.5)
HGB BLD-MCNC: 9.4 G/DL — LOW (ref 11.5–15.5)
KETONES UR-MCNC: ABNORMAL
LEUKOCYTE ESTERASE UR-ACNC: NEGATIVE — SIGNIFICANT CHANGE UP
LYMPHOCYTES # BLD AUTO: 1.9 K/UL — SIGNIFICANT CHANGE UP (ref 1–3.3)
LYMPHOCYTES # BLD AUTO: 10.9 % — LOW (ref 13–44)
MCHC RBC-ENTMCNC: 30.9 PG — SIGNIFICANT CHANGE UP (ref 27–34)
MCHC RBC-ENTMCNC: 31.7 PG — SIGNIFICANT CHANGE UP (ref 27–34)
MCHC RBC-ENTMCNC: 33.4 GM/DL — SIGNIFICANT CHANGE UP (ref 32–36)
MCHC RBC-ENTMCNC: 33.9 GM/DL — SIGNIFICANT CHANGE UP (ref 32–36)
MCV RBC AUTO: 92.7 FL — SIGNIFICANT CHANGE UP (ref 80–100)
MCV RBC AUTO: 93.5 FL — SIGNIFICANT CHANGE UP (ref 80–100)
MONOCYTES # BLD AUTO: 1.5 K/UL — HIGH (ref 0–0.9)
MONOCYTES NFR BLD AUTO: 8.6 % — SIGNIFICANT CHANGE UP (ref 2–14)
NEUTROPHILS # BLD AUTO: 13.7 K/UL — HIGH (ref 1.8–7.4)
NEUTROPHILS NFR BLD AUTO: 79.5 % — HIGH (ref 43–77)
NITRITE UR-MCNC: NEGATIVE — SIGNIFICANT CHANGE UP
PH UR: 5 — SIGNIFICANT CHANGE UP (ref 4.8–8)
PLATELET # BLD AUTO: 342 K/UL — SIGNIFICANT CHANGE UP (ref 150–400)
PLATELET # BLD AUTO: 345 K/UL — SIGNIFICANT CHANGE UP (ref 150–400)
POTASSIUM SERPL-MCNC: 3.5 MMOL/L — SIGNIFICANT CHANGE UP (ref 3.5–5.3)
POTASSIUM SERPL-SCNC: 3.5 MMOL/L — SIGNIFICANT CHANGE UP (ref 3.5–5.3)
PROT UR-MCNC: NEGATIVE — SIGNIFICANT CHANGE UP
RBC # BLD: 2.9 M/UL — LOW (ref 3.8–5.2)
RBC # BLD: 3.05 M/UL — LOW (ref 3.8–5.2)
RBC # FLD: 11.8 % — SIGNIFICANT CHANGE UP (ref 10.3–14.5)
RBC # FLD: 12.1 % — SIGNIFICANT CHANGE UP (ref 10.3–14.5)
SODIUM SERPL-SCNC: 143 MMOL/L — SIGNIFICANT CHANGE UP (ref 135–145)
SP GR SPEC: 1.02 — SIGNIFICANT CHANGE UP (ref 1.01–1.02)
UROBILINOGEN FLD QL: NEGATIVE — SIGNIFICANT CHANGE UP
WBC # BLD: 16.7 K/UL — HIGH (ref 3.8–10.5)
WBC # BLD: 17.2 K/UL — HIGH (ref 3.8–10.5)
WBC # FLD AUTO: 16.7 K/UL — HIGH (ref 3.8–10.5)
WBC # FLD AUTO: 17.2 K/UL — HIGH (ref 3.8–10.5)

## 2017-01-27 RX ORDER — HALOPERIDOL DECANOATE 100 MG/ML
1 INJECTION INTRAMUSCULAR EVERY 8 HOURS
Qty: 0 | Refills: 0 | Status: DISCONTINUED | OUTPATIENT
Start: 2017-01-27 | End: 2017-01-30

## 2017-01-27 RX ADMIN — HEPARIN SODIUM 5000 UNIT(S): 5000 INJECTION INTRAVENOUS; SUBCUTANEOUS at 08:02

## 2017-01-27 RX ADMIN — LATANOPROST 1 DROP(S): 0.05 SOLUTION/ DROPS OPHTHALMIC; TOPICAL at 21:29

## 2017-01-27 RX ADMIN — HEPARIN SODIUM 5000 UNIT(S): 5000 INJECTION INTRAVENOUS; SUBCUTANEOUS at 18:07

## 2017-01-28 LAB
ANION GAP SERPL CALC-SCNC: 11 MMOL/L — SIGNIFICANT CHANGE UP (ref 5–17)
BASOPHILS # BLD AUTO: 0.1 K/UL — SIGNIFICANT CHANGE UP (ref 0–0.2)
BASOPHILS NFR BLD AUTO: 0.7 % — SIGNIFICANT CHANGE UP (ref 0–2)
BUN SERPL-MCNC: 15 MG/DL — SIGNIFICANT CHANGE UP (ref 7–18)
CALCIUM SERPL-MCNC: 8.3 MG/DL — LOW (ref 8.4–10.5)
CHLORIDE SERPL-SCNC: 108 MMOL/L — SIGNIFICANT CHANGE UP (ref 96–108)
CO2 SERPL-SCNC: 25 MMOL/L — SIGNIFICANT CHANGE UP (ref 22–31)
CREAT SERPL-MCNC: 0.37 MG/DL — LOW (ref 0.5–1.3)
EOSINOPHIL # BLD AUTO: 0.1 K/UL — SIGNIFICANT CHANGE UP (ref 0–0.5)
EOSINOPHIL NFR BLD AUTO: 0.7 % — SIGNIFICANT CHANGE UP (ref 0–6)
GLUCOSE SERPL-MCNC: 56 MG/DL — LOW (ref 70–99)
HCT VFR BLD CALC: 28.3 % — LOW (ref 34.5–45)
HGB BLD-MCNC: 9.5 G/DL — LOW (ref 11.5–15.5)
LYMPHOCYTES # BLD AUTO: 1.5 K/UL — SIGNIFICANT CHANGE UP (ref 1–3.3)
LYMPHOCYTES # BLD AUTO: 12 % — LOW (ref 13–44)
MCHC RBC-ENTMCNC: 31.9 PG — SIGNIFICANT CHANGE UP (ref 27–34)
MCHC RBC-ENTMCNC: 33.6 GM/DL — SIGNIFICANT CHANGE UP (ref 32–36)
MCV RBC AUTO: 95.1 FL — SIGNIFICANT CHANGE UP (ref 80–100)
MONOCYTES # BLD AUTO: 0.5 K/UL — SIGNIFICANT CHANGE UP (ref 0–0.9)
MONOCYTES NFR BLD AUTO: 4.1 % — SIGNIFICANT CHANGE UP (ref 2–14)
NEUTROPHILS # BLD AUTO: 10.6 K/UL — HIGH (ref 1.8–7.4)
NEUTROPHILS NFR BLD AUTO: 82.5 % — HIGH (ref 43–77)
PLATELET # BLD AUTO: 369 K/UL — SIGNIFICANT CHANGE UP (ref 150–400)
POTASSIUM SERPL-MCNC: 3.4 MMOL/L — LOW (ref 3.5–5.3)
POTASSIUM SERPL-SCNC: 3.4 MMOL/L — LOW (ref 3.5–5.3)
RBC # BLD: 2.98 M/UL — LOW (ref 3.8–5.2)
RBC # FLD: 12.2 % — SIGNIFICANT CHANGE UP (ref 10.3–14.5)
SODIUM SERPL-SCNC: 144 MMOL/L — SIGNIFICANT CHANGE UP (ref 135–145)
WBC # BLD: 12.8 K/UL — HIGH (ref 3.8–10.5)
WBC # FLD AUTO: 12.8 K/UL — HIGH (ref 3.8–10.5)

## 2017-01-28 RX ORDER — POTASSIUM CHLORIDE 20 MEQ
40 PACKET (EA) ORAL ONCE
Qty: 0 | Refills: 0 | Status: DISCONTINUED | OUTPATIENT
Start: 2017-01-28 | End: 2017-01-28

## 2017-01-28 RX ORDER — POTASSIUM CHLORIDE 20 MEQ
10 PACKET (EA) ORAL
Qty: 0 | Refills: 0 | Status: COMPLETED | OUTPATIENT
Start: 2017-01-28 | End: 2017-01-28

## 2017-01-28 RX ADMIN — LATANOPROST 1 DROP(S): 0.05 SOLUTION/ DROPS OPHTHALMIC; TOPICAL at 22:06

## 2017-01-28 RX ADMIN — Medication 100 MILLIEQUIVALENT(S): at 16:16

## 2017-01-28 RX ADMIN — HEPARIN SODIUM 5000 UNIT(S): 5000 INJECTION INTRAVENOUS; SUBCUTANEOUS at 05:50

## 2017-01-28 RX ADMIN — Medication 100 MILLIGRAM(S): at 22:05

## 2017-01-28 RX ADMIN — HEPARIN SODIUM 5000 UNIT(S): 5000 INJECTION INTRAVENOUS; SUBCUTANEOUS at 17:23

## 2017-01-28 RX ADMIN — Medication 100 MILLIEQUIVALENT(S): at 17:23

## 2017-01-28 RX ADMIN — Medication 100 MILLIEQUIVALENT(S): at 15:12

## 2017-01-29 LAB
ANION GAP SERPL CALC-SCNC: 10 MMOL/L — SIGNIFICANT CHANGE UP (ref 5–17)
BASOPHILS # BLD AUTO: 0.1 K/UL — SIGNIFICANT CHANGE UP (ref 0–0.2)
BASOPHILS NFR BLD AUTO: 0.7 % — SIGNIFICANT CHANGE UP (ref 0–2)
BUN SERPL-MCNC: 17 MG/DL — SIGNIFICANT CHANGE UP (ref 7–18)
CALCIUM SERPL-MCNC: 8 MG/DL — LOW (ref 8.4–10.5)
CHLORIDE SERPL-SCNC: 106 MMOL/L — SIGNIFICANT CHANGE UP (ref 96–108)
CO2 SERPL-SCNC: 26 MMOL/L — SIGNIFICANT CHANGE UP (ref 22–31)
CREAT SERPL-MCNC: 0.48 MG/DL — LOW (ref 0.5–1.3)
EOSINOPHIL # BLD AUTO: 0 K/UL — SIGNIFICANT CHANGE UP (ref 0–0.5)
EOSINOPHIL NFR BLD AUTO: 0.4 % — SIGNIFICANT CHANGE UP (ref 0–6)
GLUCOSE SERPL-MCNC: 73 MG/DL — SIGNIFICANT CHANGE UP (ref 70–99)
HCT VFR BLD CALC: 27.8 % — LOW (ref 34.5–45)
HGB BLD-MCNC: 9.4 G/DL — LOW (ref 11.5–15.5)
LYMPHOCYTES # BLD AUTO: 1.1 K/UL — SIGNIFICANT CHANGE UP (ref 1–3.3)
LYMPHOCYTES # BLD AUTO: 10.1 % — LOW (ref 13–44)
MCHC RBC-ENTMCNC: 31.8 PG — SIGNIFICANT CHANGE UP (ref 27–34)
MCHC RBC-ENTMCNC: 33.8 GM/DL — SIGNIFICANT CHANGE UP (ref 32–36)
MCV RBC AUTO: 94.3 FL — SIGNIFICANT CHANGE UP (ref 80–100)
MONOCYTES # BLD AUTO: 0.6 K/UL — SIGNIFICANT CHANGE UP (ref 0–0.9)
MONOCYTES NFR BLD AUTO: 5.5 % — SIGNIFICANT CHANGE UP (ref 2–14)
NEUTROPHILS # BLD AUTO: 9.2 K/UL — HIGH (ref 1.8–7.4)
NEUTROPHILS NFR BLD AUTO: 83.4 % — HIGH (ref 43–77)
PLATELET # BLD AUTO: 362 K/UL — SIGNIFICANT CHANGE UP (ref 150–400)
POTASSIUM SERPL-MCNC: 3.5 MMOL/L — SIGNIFICANT CHANGE UP (ref 3.5–5.3)
POTASSIUM SERPL-SCNC: 3.5 MMOL/L — SIGNIFICANT CHANGE UP (ref 3.5–5.3)
RBC # BLD: 2.95 M/UL — LOW (ref 3.8–5.2)
RBC # FLD: 12.2 % — SIGNIFICANT CHANGE UP (ref 10.3–14.5)
SODIUM SERPL-SCNC: 142 MMOL/L — SIGNIFICANT CHANGE UP (ref 135–145)
WBC # BLD: 11.1 K/UL — HIGH (ref 3.8–10.5)
WBC # FLD AUTO: 11.1 K/UL — HIGH (ref 3.8–10.5)

## 2017-01-29 RX ORDER — ACETAMINOPHEN 500 MG
650 TABLET ORAL EVERY 6 HOURS
Qty: 0 | Refills: 0 | Status: DISCONTINUED | OUTPATIENT
Start: 2017-01-29 | End: 2017-01-30

## 2017-01-29 RX ORDER — MEMANTINE HYDROCHLORIDE 10 MG/1
10 TABLET ORAL DAILY
Qty: 0 | Refills: 0 | Status: DISCONTINUED | OUTPATIENT
Start: 2017-01-29 | End: 2017-01-30

## 2017-01-29 RX ADMIN — Medication 325 MILLIGRAM(S): at 13:56

## 2017-01-29 RX ADMIN — Medication 100 MILLIGRAM(S): at 06:56

## 2017-01-29 RX ADMIN — Medication 100 MILLIGRAM(S): at 13:56

## 2017-01-29 RX ADMIN — Medication 650 MILLIGRAM(S): at 14:30

## 2017-01-29 RX ADMIN — HEPARIN SODIUM 5000 UNIT(S): 5000 INJECTION INTRAVENOUS; SUBCUTANEOUS at 06:56

## 2017-01-29 RX ADMIN — MEMANTINE HYDROCHLORIDE 10 MILLIGRAM(S): 10 TABLET ORAL at 13:56

## 2017-01-29 RX ADMIN — Medication 325 MILLIGRAM(S): at 09:24

## 2017-01-29 RX ADMIN — Medication 650 MILLIGRAM(S): at 13:57

## 2017-01-29 RX ADMIN — PANTOPRAZOLE SODIUM 40 MILLIGRAM(S): 20 TABLET, DELAYED RELEASE ORAL at 06:56

## 2017-01-29 RX ADMIN — Medication 1 MILLIGRAM(S): at 13:56

## 2017-01-29 RX ADMIN — HEPARIN SODIUM 5000 UNIT(S): 5000 INJECTION INTRAVENOUS; SUBCUTANEOUS at 18:56

## 2017-01-29 RX ADMIN — Medication 325 MILLIGRAM(S): at 18:56

## 2017-01-30 ENCOUNTER — TRANSCRIPTION ENCOUNTER (OUTPATIENT)
Age: 82
End: 2017-01-30

## 2017-01-30 VITALS
DIASTOLIC BLOOD PRESSURE: 70 MMHG | SYSTOLIC BLOOD PRESSURE: 188 MMHG | RESPIRATION RATE: 16 BRPM | OXYGEN SATURATION: 95 % | TEMPERATURE: 98 F | HEART RATE: 92 BPM

## 2017-01-30 PROBLEM — F03.91 UNSPECIFIED DEMENTIA WITH BEHAVIORAL DISTURBANCE: Chronic | Status: ACTIVE | Noted: 2017-01-13

## 2017-01-30 PROCEDURE — 85730 THROMBOPLASTIN TIME PARTIAL: CPT

## 2017-01-30 PROCEDURE — 93306 TTE W/DOPPLER COMPLETE: CPT

## 2017-01-30 PROCEDURE — 84443 ASSAY THYROID STIM HORMONE: CPT

## 2017-01-30 PROCEDURE — 97162 PT EVAL MOD COMPLEX 30 MIN: CPT

## 2017-01-30 PROCEDURE — 88305 TISSUE EXAM BY PATHOLOGIST: CPT

## 2017-01-30 PROCEDURE — 80048 BASIC METABOLIC PNL TOTAL CA: CPT

## 2017-01-30 PROCEDURE — 84484 ASSAY OF TROPONIN QUANT: CPT

## 2017-01-30 PROCEDURE — 97110 THERAPEUTIC EXERCISES: CPT

## 2017-01-30 PROCEDURE — 99285 EMERGENCY DEPT VISIT HI MDM: CPT | Mod: 25

## 2017-01-30 PROCEDURE — 71045 X-RAY EXAM CHEST 1 VIEW: CPT

## 2017-01-30 PROCEDURE — 86901 BLOOD TYPING SEROLOGIC RH(D): CPT

## 2017-01-30 PROCEDURE — 93005 ELECTROCARDIOGRAM TRACING: CPT

## 2017-01-30 PROCEDURE — 83735 ASSAY OF MAGNESIUM: CPT

## 2017-01-30 PROCEDURE — 97116 GAIT TRAINING THERAPY: CPT

## 2017-01-30 PROCEDURE — 70450 CT HEAD/BRAIN W/O DYE: CPT

## 2017-01-30 PROCEDURE — 86920 COMPATIBILITY TEST SPIN: CPT

## 2017-01-30 PROCEDURE — 73502 X-RAY EXAM HIP UNI 2-3 VIEWS: CPT

## 2017-01-30 PROCEDURE — 88311 DECALCIFY TISSUE: CPT

## 2017-01-30 PROCEDURE — 84100 ASSAY OF PHOSPHORUS: CPT

## 2017-01-30 PROCEDURE — 86850 RBC ANTIBODY SCREEN: CPT

## 2017-01-30 PROCEDURE — 83036 HEMOGLOBIN GLYCOSYLATED A1C: CPT

## 2017-01-30 PROCEDURE — C1776: CPT

## 2017-01-30 PROCEDURE — 81001 URINALYSIS AUTO W/SCOPE: CPT

## 2017-01-30 PROCEDURE — 85610 PROTHROMBIN TIME: CPT

## 2017-01-30 PROCEDURE — 85027 COMPLETE CBC AUTOMATED: CPT

## 2017-01-30 PROCEDURE — C1889: CPT

## 2017-01-30 PROCEDURE — 80053 COMPREHEN METABOLIC PANEL: CPT

## 2017-01-30 PROCEDURE — 73522 X-RAY EXAM HIPS BI 3-4 VIEWS: CPT

## 2017-01-30 PROCEDURE — 99231 SBSQ HOSP IP/OBS SF/LOW 25: CPT

## 2017-01-30 PROCEDURE — 97530 THERAPEUTIC ACTIVITIES: CPT

## 2017-01-30 PROCEDURE — 80061 LIPID PANEL: CPT

## 2017-01-30 PROCEDURE — 86900 BLOOD TYPING SEROLOGIC ABO: CPT

## 2017-01-30 RX ORDER — SENNA PLUS 8.6 MG/1
2 TABLET ORAL
Qty: 0 | Refills: 0 | COMMUNITY
Start: 2017-01-30

## 2017-01-30 RX ORDER — DIVALPROEX SODIUM 500 MG/1
1 TABLET, DELAYED RELEASE ORAL
Qty: 0 | Refills: 0 | COMMUNITY

## 2017-01-30 RX ORDER — HALOPERIDOL DECANOATE 100 MG/ML
1 INJECTION INTRAMUSCULAR
Qty: 90 | Refills: 0 | OUTPATIENT
Start: 2017-01-30 | End: 2017-03-01

## 2017-01-30 RX ORDER — OXYCODONE HYDROCHLORIDE 5 MG/1
1 TABLET ORAL
Qty: 0 | Refills: 0 | COMMUNITY
Start: 2017-01-30

## 2017-01-30 RX ORDER — HEPARIN SODIUM 5000 [USP'U]/ML
5000 INJECTION INTRAVENOUS; SUBCUTANEOUS
Qty: 0 | Refills: 0 | COMMUNITY
Start: 2017-01-30

## 2017-01-30 RX ORDER — ACETAMINOPHEN 500 MG
2 TABLET ORAL
Qty: 0 | Refills: 0 | COMMUNITY
Start: 2017-01-30

## 2017-01-30 RX ORDER — MIRTAZAPINE 45 MG/1
1 TABLET, ORALLY DISINTEGRATING ORAL
Qty: 0 | Refills: 0 | COMMUNITY

## 2017-01-30 RX ORDER — DOCUSATE SODIUM 100 MG
1 CAPSULE ORAL
Qty: 0 | Refills: 0 | COMMUNITY
Start: 2017-01-30

## 2017-01-30 RX ORDER — ASCORBIC ACID 60 MG
1 TABLET,CHEWABLE ORAL
Qty: 0 | Refills: 0 | COMMUNITY

## 2017-01-30 RX ORDER — FERROUS SULFATE 325(65) MG
1 TABLET ORAL
Qty: 0 | Refills: 0 | COMMUNITY

## 2017-01-30 RX ORDER — ENOXAPARIN SODIUM 100 MG/ML
40 INJECTION SUBCUTANEOUS
Qty: 0 | Refills: 0 | COMMUNITY

## 2017-01-30 RX ORDER — FOLIC ACID 0.8 MG
1 TABLET ORAL
Qty: 0 | Refills: 0 | COMMUNITY
Start: 2017-01-30

## 2017-01-30 RX ADMIN — HEPARIN SODIUM 5000 UNIT(S): 5000 INJECTION INTRAVENOUS; SUBCUTANEOUS at 06:28

## 2017-01-30 NOTE — DISCHARGE NOTE ADULT - PATIENT PORTAL LINK FT
“You can access the FollowHealth Patient Portal, offered by [facility], by registering with the following website: [email or web address]”

## 2017-01-30 NOTE — DISCHARGE NOTE ADULT - CARE PROVIDER_API CALL
Johann Uriostegui), Orthopaedic Surgery  [address] [place] [address]  Phone: [phone]  Fax: [phone]

## 2017-01-30 NOTE — DISCHARGE NOTE ADULT - HOSPITAL COURSE
89F from [facility] [place] with PMH of dementia, depression and anxiety was brought to the ED because of R hip pain found to have fracture.     1. Right femoral neck fracture s/p R Hip Hemiarthroplasty on 1/24/17 by Dr. Uriostegui  -Hemodynamically stable, Echo: >55% EF, grade II dd and normal LVF  -c/w pain control   -c/w daily incentive spirometry   -c/w DVT prophylaxis  -PT following patient     2. Leukocytosis: likely reactive 2/2 surgery. Afebrile and normotensive. Normal UA and CXR. No need for antibiotics for now.    3. Hypernatremia, mild, resolved with D5 at 75cc/hr. No need to continue IVFs.     4. Dementia with agitation: as per Psych Dr. Bates; recommends dc remeron and depakote. Haldol 1mg IM q8hrs prn agitation, will await f/u Dr. Guajardo today.    5. GI/DVT Ppx    6. Dispo: [facility] [place] as per family. 89F from [facility] with PMH of dementia, depression and anxiety was brought to the ED because of R hip pain found to have fracture.     1. Right femoral neck fracture s/p R Hip Hemiarthroplasty on 1/24/17 by Dr. Uriostegui  -Hemodynamically stable, Echo: >55% EF, grade II dd and normal LVF  -c/w pain control   -c/w DVT prophylaxis lovenox 40mg SC daily for 3-6 weeks until pt. is ambulatory  -PT following patient     2. Leukocytosis: likely reactive 2/2 surgery. Afebrile and normotensive. Normal UA and CXR. No need for antibiotics for now.    3. Hypernatremia, mild, resolved with D5 at 75cc/hr. No need to continue IVFs.     4. Dementia with agitation: as per Psych Dr. Bates; recommends dc remeron and depakote. Haldol 1mg IM q8hrs prn agitation,  f/u Dr. Guajardo today to continue haldol 1mg oral as needed for agitation.    5. Anemia: stable, continue iron supplement, folic acid, vitC.    6. GI/DVT Ppx    7. Dispo: [facility] [place] as per family.    D/w Dr. Massey, pt. is stable for discharge to [facility].

## 2017-01-30 NOTE — DISCHARGE NOTE ADULT - CARE PLAN
Principal Discharge DX:	Closed fracture of neck of right femur, initial encounter  Goal:	s/p R Hip Hemiarthroplasty on 1/24/17 by Dr. Uriostegui  Secondary Diagnosis:	Dementia with behavioral disturbance, unspecified dementia type Principal Discharge DX:	Closed fracture of neck of right femur, initial encounter  Goal:	s/p R Hip Hemiarthroplasty on 1/24/17 by Dr. Uriostegui  Instructions for follow-up, activity and diet:	discharge to rehab, continue PT, pain management, DVT ppx with lovenox  until pt. more ambulatory. F/u Dr. Uriostegui Ortho in 1 week.  Secondary Diagnosis:	Dementia with behavioral disturbance, unspecified dementia type  Instructions for follow-up, activity and diet:	discontinue remeron and depakene, continue namenda. Haldol as needed for agitation.

## 2017-01-30 NOTE — DISCHARGE NOTE ADULT - MEDICATION SUMMARY - MEDICATIONS TO STOP TAKING
I will STOP taking the medications listed below when I get home from the hospital:    Depakote Sprinkles 125 mg oral delayed release capsule  -- 1 cap(s) by mouth 2 times a day    Remeron 15 mg oral tablet  -- 1 tab(s) by mouth once a day (at bedtime)

## 2017-01-30 NOTE — DISCHARGE NOTE ADULT - MEDICATION SUMMARY - MEDICATIONS TO TAKE
I will START or STAY ON the medications listed below when I get home from the hospital:    acetaminophen 325 mg oral tablet  -- 2 tab(s) by mouth every 6 hours, As needed, Moderate Pain (4 - 6), and/or headache. Can give for severe pain (7-10)  if pt refuses percocet  -- Indication: For Pain    acetaminophen-oxyCODONE 325 mg-5 mg oral tablet  -- 1 tab(s) by mouth every 4 hours, As needed, Severe Pain (7 - 10)  -- Indication: For Pain    aluminum hydroxide-magnesium hydroxide 200 mg-200 mg/5 mL oral suspension  -- 30 milliliter(s) by mouth 4 times a day, As needed, Indigestion  -- Indication: For Dyspepsia    haloperidol 1 mg oral tablet  -- 1 tab(s) by mouth 3 times a day, As Needed -for agitation  -- Indication: For agitation dementia    ferrous sulfate 325 mg (65 mg elemental iron) oral tablet  -- 1 tab(s) by mouth 3 times a day  -- Indication: For anemia    docusate sodium 100 mg oral capsule  -- 1 cap(s) by mouth 3 times a day  -- Indication: For Constipation    senna oral tablet  -- 2 tab(s) by mouth once a day (at bedtime)  -- Indication: For Constipation    Namenda XR 14 mg oral capsule, extended release  -- 1 cap(s) by mouth once a day  -- Indication: For Dementia    Xalatan 0.005% ophthalmic solution  -- 1 drop(s) to each affected eye once a day (at bedtime)  -- Indication: For eye drops    folic acid 1 mg oral tablet  -- 1 tab(s) by mouth once a day  -- Indication: For anemia    Vitamin C 500 mg oral tablet, chewable  -- 1 tab(s) by mouth once a day  -- Indication: For anemia I will START or STAY ON the medications listed below when I get home from the hospital:    acetaminophen 325 mg oral tablet  -- 2 tab(s) by mouth every 6 hours, As needed, Moderate Pain (4 - 6), and/or headache. Can give for severe pain (7-10)  if pt refuses percocet  -- Indication: For Pain    acetaminophen-oxyCODONE 325 mg-5 mg oral tablet  -- 1 tab(s) by mouth every 4 hours, As needed, Severe Pain (7 - 10)  -- Indication: For Pain    aluminum hydroxide-magnesium hydroxide 200 mg-200 mg/5 mL oral suspension  -- 30 milliliter(s) by mouth 4 times a day, As needed, Indigestion  -- Indication: For Dyspepsia    Lovenox 40 mg/0.4 mL injectable solution  -- 40 milligram(s) subcutaneous once a day for 3 - 6 weeks until ambulatory  -- Indication: For DVT prophylaxis    haloperidol 1 mg oral tablet  -- 1 tab(s) by mouth 3 times a day, As Needed -for agitation  -- Indication: For agitation    ferrous sulfate 325 mg (65 mg elemental iron) oral tablet  -- 1 tab(s) by mouth 3 times a day  -- Indication: For anemia    docusate sodium 100 mg oral capsule  -- 1 cap(s) by mouth 3 times a day  -- Indication: For Constipation    senna oral tablet  -- 2 tab(s) by mouth once a day (at bedtime)  -- Indication: For Constipation    Namenda XR 14 mg oral capsule, extended release  -- 1 cap(s) by mouth once a day  -- Indication: For Dementia    Xalatan 0.005% ophthalmic solution  -- 1 drop(s) to each affected eye once a day (at bedtime)  -- Indication: For eye drops    folic acid 1 mg oral tablet  -- 1 tab(s) by mouth once a day  -- Indication: For anemia    Vitamin C 500 mg oral tablet, chewable  -- 1 tab(s) by mouth once a day  -- Indication: For anemia
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
## 2017-02-03 DIAGNOSIS — Z88.2 ALLERGY STATUS TO SULFONAMIDES: ICD-10-CM

## 2017-02-03 DIAGNOSIS — F03.90 UNSPECIFIED DEMENTIA, UNSPECIFIED SEVERITY, WITHOUT BEHAVIORAL DISTURBANCE, PSYCHOTIC DISTURBANCE, MOOD DISTURBANCE, AND ANXIETY: ICD-10-CM

## 2017-02-03 DIAGNOSIS — F41.9 ANXIETY DISORDER, UNSPECIFIED: ICD-10-CM

## 2017-02-03 DIAGNOSIS — E87.0 HYPEROSMOLALITY AND HYPERNATREMIA: ICD-10-CM

## 2017-02-03 DIAGNOSIS — J98.11 ATELECTASIS: ICD-10-CM

## 2017-02-03 DIAGNOSIS — H40.9 UNSPECIFIED GLAUCOMA: ICD-10-CM

## 2017-02-03 DIAGNOSIS — D72.829 ELEVATED WHITE BLOOD CELL COUNT, UNSPECIFIED: ICD-10-CM

## 2017-02-03 DIAGNOSIS — M81.0 AGE-RELATED OSTEOPOROSIS WITHOUT CURRENT PATHOLOGICAL FRACTURE: ICD-10-CM

## 2017-02-03 DIAGNOSIS — F05 DELIRIUM DUE TO KNOWN PHYSIOLOGICAL CONDITION: ICD-10-CM

## 2017-02-03 DIAGNOSIS — S72.011A UNSPECIFIED INTRACAPSULAR FRACTURE OF RIGHT FEMUR, INITIAL ENCOUNTER FOR CLOSED FRACTURE: ICD-10-CM

## 2017-02-03 DIAGNOSIS — D64.9 ANEMIA, UNSPECIFIED: ICD-10-CM

## 2017-02-03 DIAGNOSIS — F32.9 MAJOR DEPRESSIVE DISORDER, SINGLE EPISODE, UNSPECIFIED: ICD-10-CM

## 2017-02-03 DIAGNOSIS — I10 ESSENTIAL (PRIMARY) HYPERTENSION: ICD-10-CM

## 2017-05-17 ENCOUNTER — APPOINTMENT (OUTPATIENT)
Dept: OPHTHALMOLOGY | Facility: CLINIC | Age: 82
End: 2017-05-17

## 2020-02-28 NOTE — ED PROVIDER NOTE - NS ED MD EM SELECTION
Luz Pacheco [facility]      YOB: 1990    HPI:  Nettie Rothman is a 34 y.o. female [address]  Patient with RLTCS 3 weeks   States she is doing well  Breast and bottle feeding  No complaints or issues  Denies any PP depression s/sx  Lochia light spotting now  Denies pain or issues with incision      OB History    Para Term  AB Living   3 2 2 0 1 2   SAB TAB Ectopic Molar Multiple Live Births   1 0 0 0 0 2      # Outcome Date GA Lbr Melvin/2nd Weight Sex Delivery Anes PTL Lv   3 Term 20 38w0d  4 lb 6.1 oz (1.986 kg) M CS-LTranv Spinal N MANDY      Complications: IUGR (intrauterine growth restriction) affecting care of mother      Name: Philly [facility]: 9  Apgar5: 9   2 SAB            1 Term 09 38w0d  5 lb 12 oz (2.608 kg) M CS-Unspec   MANDY      Name: Wes Fried: 5  Apgar5: 9       Past Medical History:   Diagnosis Date    Depression     Gestational diabetes mellitus (GDM), antepartum 2019    IBS (irritable bowel syndrome)     Mental disorder     BIPOLAR    Pnctr w/o FB of r idx fngr w/o damage to nail, subs 2019    TSH deficiency 7/10/2019       Past Surgical History:   Procedure Laterality Date     SECTION       SECTION N/A 2020     SECTION performed by Pamela Collins, DO at [facility] L&D OR       Family History   Problem Relation Age of Onset    Hypertension Father    [employer] Arthritis Mother         FIBROMYALGIA, BLOOD TRANSFUSIONS, VARICOSE VEINS    Pancreatic Cancer Paternal Grandfather     Lung Cancer Paternal Grandfather        Social History     Socioeconomic History    Marital status:      Spouse name: Not on file    Number of children: Not on file    Years of education: Not on file    Highest education level: Not on file   Occupational History    Not on file   Social Needs    Financial resource strain: Not on file    Food insecurity:     Worry: Not on file     Inability: Not on file    Transportation needs:     Medical: Not on file     Non-medical: Not on file   Tobacco Use    Smoking status: Current Every Day Smoker     Packs/day: 0.25     Years: 13.00     Pack years: 3.25     Types: Cigarettes     Start date: 6/11/2002    Smokeless tobacco: Never Used    Tobacco comment: \"4cigs/day\" 1/14/2020   Substance and Sexual Activity    Alcohol use: Not Currently     Comment: occasionally    Drug use: Yes     Frequency: 3.0 times per week     Types: Marijuana     Comment: \"3x's/week\" 1/14/2020    Sexual activity: Yes     Partners: Male   Lifestyle    Physical activity:     Days per week: Not on file     Minutes per session: Not on file    Stress: Not on file   Relationships    Social connections:     Talks on phone: Not on file     Gets together: Not on file     Attends [employer] service: Not on file     Active member of club or organization: Not on file     Attends meetings of clubs or organizations: Not on file     Relationship status: Not on file    Intimate partner violence:     Fear of current or ex partner: Not on file     Emotionally abused: Not on file     Physically abused: Not on file     Forced sexual activity: Not on file   Other Topics Concern    Not on file   Social History Narrative    Not on file         MEDICATIONS:  Current Outpatient Medications   Medication Sig Dispense Refill    Norgestim-Eth Estrad Triphasic (TRI-SPRINTEC) 0.18/0.215/0.25 MG-35 MCG TABS Take 1 tablet by mouth daily 28 tablet 11    ibuprofen (ADVIL;MOTRIN) 800 MG tablet Take 1 tablet by mouth every 8 hours as needed for Pain (Patient not taking: Reported on 2/26/2020) 30 tablet 0     No current facility-administered medications for this visit.               ALLERGIES:  Allergies as of 02/26/2020 - Review Complete 02/26/2020   Allergen Reaction Noted    Arestin [minocycline] Hives 06/11/2019         Blood pressure 110/70, pulse 80, height 5' 2\" (1.575 m), weight 99 lb (44.9 kg), not currently breastfeeding. Abdomen: Soft non-tender; good bowel sounds. No guarding, rebound or rigidity. No CVA tenderness bilaterally. Incision C/D/I    Extremities: No calf tenderness, DTR 2/4, and No edema bilaterally    Pelvic: deferred    Diagnostics:  No results found. Lab Results:  Results for orders placed or performed during the hospital encounter of 01/20/20   Urine culture clean catch   Result Value Ref Range    Specimen Description . CLEAN CATCH URINE     Special Requests NOT REPORTED     Culture NO SIGNIFICANT GROWTH    CBC   Result Value Ref Range    WBC 16.1 (H) 3.5 - 11.0 k/uL    RBC 4.14 4.0 - 5.2 m/uL    Hemoglobin 12.9 12.0 - 16.0 g/dL    Hematocrit 38.0 36 - 46 %    MCV 91.9 80 - 100 fL    MCH 31.3 26 - 34 pg    MCHC 34.0 31 - 37 g/dL    RDW 14.2 11.5 - 14.9 %    Platelets 355 160 - 553 k/uL    MPV 8.6 6.0 - 12.0 fL    NRBC Automated NOT REPORTED per 100 WBC   DRUG SCREEN MULTI URINE   Result Value Ref Range    Amphetamine Screen, Ur NEGATIVE NEGATIVE    Barbiturate Screen, Ur NEGATIVE NEGATIVE    Benzodiazepine Screen, Urine NEGATIVE NEGATIVE    Cocaine Metabolite, Urine NEGATIVE NEGATIVE    Methadone Screen, Urine NEGATIVE NEGATIVE    Opiates, Urine NEGATIVE NEGATIVE    Phencyclidine, Urine NEGATIVE NEGATIVE    Propoxyphene, Urine NOT REPORTED NEGATIVE    Cannabinoid Scrn, Ur POSITIVE (A) NEGATIVE    Oxycodone Screen, Ur NEGATIVE NEGATIVE    Methamphetamine, Urine NOT REPORTED NEGATIVE    Tricyclic Antidepressants, Urine NOT REPORTED NEGATIVE    MDMA, Urine NOT REPORTED NEGATIVE    Buprenorphine Urine NOT REPORTED NEGATIVE    Test Information       Assay provides medical screening only. The absence of expected drug(s) and/or metabolite(s) may indicate diluted or adulterated urine, limitations of testing or timing of collection.    T. pallidum Ab   Result Value Ref Range    T. pallidum, IgG NONREACTIVE NONREACTIVE   Urinalysis Reflex to Culture   Result Value Ref Range Color, UA DARK YELLOW (A) YELLOW    Turbidity UA CLOUDY (A) CLEAR    Glucose, Ur NEGATIVE NEGATIVE    Bilirubin Urine (A) NEGATIVE     Presumptive positive. Unable to confirm due to unavailability of reagent. Ketones, Urine MOD (A) NEGATIVE    Specific Gravity, UA 1.023 1.000 - 1.030    Urine Hgb NEGATIVE NEGATIVE    pH, UA 6.5 5.0 - 8.0    Protein, UA TRACE (A) NEGATIVE    Urobilinogen, Urine Normal Normal    Nitrite, Urine NEGATIVE NEGATIVE    Leukocyte Esterase, Urine SMALL (A) NEGATIVE    Urinalysis Comments NOT REPORTED    Microscopic Urinalysis   Result Value Ref Range    -          WBC, UA 10 TO 20 /HPF    RBC, UA 2 TO 5 /HPF    Casts UA NOT REPORTED /LPF    Crystals, UA NOT REPORTED None /HPF    Epithelial Cells UA 10 TO 20 /HPF    Renal Epithelial, UA NOT REPORTED 0 /HPF    Bacteria, UA MODERATE (A) None    Mucus, UA 2+ (A) None    Trichomonas, UA NOT REPORTED None    Amorphous, UA NOT REPORTED None    Other Observations UA NOT REPORTED NOT REQ. Yeast, UA NOT REPORTED None   Surgical Pathology   Result Value Ref Range    Surgical Pathology Report       [address] [place] [address] 81.  [phone]  Fax: [phone]  SURGICAL PATHOLOGY REPORT    Patient Name: Gabrielle Torres  MR#: [id number]  Specimen #[id number]       Final Diagnosis  PLACENTA, CLINICALLY 38 WEEKS GESTATIONAL AGE, CAESAREAN DELIVERY:         THREE-VESSEL UMBILICAL CORD WITH NO EVIDENCE OF ACUTE FUNISITIS  IDENTIFIED    FETAL MEMBRANES WITH PIGMENT-LADEN MACROPHAGES CONSISTENT WITH  MECONIUM STAINING         HISTOLOGICALLY MATURE THIRD TRIMESTER PLACENTAL DISC (WEIGHT: 400  GRAMS) WITH SUBCHORIONIC HEMORRHAGE       Angela Rizo M.D.  **Electronically Signed Out**         [facility]/2020    Clinical Information  Baby boy @ [facility] 8557, Repeat C/S, apgar: 9/9, wt: 1986 g, 4# 6 oz.,  gestational age: 41 wks    Source:  A: Placenta    Gross Description  1.      Specimen received:  Fresh, subsequently fixed in formalin. 2.     Untrimmed weight (gm):  439 gm. 3.     Birth:  Single. 4.     Blood clots (gm):  Scant. MEMBRANES:  5 .     Placenta sac rupture (cm from margin):  3 cm. 6.     Color:  Gray-blue. 7.     Other characteristics:  No.  8.     Edema:  No.  9.     Insertion Site:  Marginal.    CORD:  10.     Site of insertion:  Eccentrically. 11.     Length and diameter (cm):  9 cm in length, 7 cm in diameter. 12.     Cord knots:  Not identified. 13.     Number of vessels:  3 blood vessels. GENERAL:  14. Trimmed weight (gm):  400 gm. 15.     Complete:  Yes. 16.     Size:  16 x 15 x 2.5 cm. 17.     Accessory lobe(s):  No.    PLACENTAL DISC:  18.     Color of fetal surface:  Blue-gray. 19.     Appearance/distribution of fetal vessels:  Dispersed. 20.     Sub-amniotic cyst(s):  No.  21.     Amnion nodosum:  No.  22.     Subchorionic fibrin at fetal surface:  Unremarkable. 23.     Appearance of cut surface (plus/minus calcifications):   Red-tan, spongy. 24.     Maternal floor:  No.  25.     Subchorionic hematoma:  Present  26. Abruption:  No.  27.     Infarct(s):  Not identified. 28.      Chorangioma (number):  No.  29. Intervillous thrombi:  Not identified. CASSETTE SUBMISSION (both fetal and maternal aspects of the placental  disc are included in these sections)  Cassette #1:  Proximal and distal umbilical cord. Cassette #2:  Membrane roll. Cassette #3:  Placenta, central.  Cassette #4 and #5:  Placenta, paracentral.  Cassette #6:  Placenta, marginal.      Microscopic Description  Six H&E stained slides are reviewed. Microscopic examination is  performed.       Hemoglobin and hematocrit, blood   Result Value Ref Range    Hemoglobin 10.2 (L) 12.0 - 16.0 g/dL    Hematocrit 30.8 (L) 36 - 46 %   POC Glucose Fingerstick   Result Value Ref Range    POC Glucose 76 65 - 105 mg/dL   POC Glucose Fingerstick   Result Value Ref Range    POC Glucose 62 (L) 65 - 105 mg/dL 42070 Comprehensive

## 2021-02-15 NOTE — ED PROVIDER NOTE - SKIN, MLM
PRE-SEDATION ASSESSMENT    CONSENT  Consent for procedure and sedation obtained: Yes    MEDICAL HISTORY  Significant medical/surgical history: No    PHYSICAL EXAM  History and Physical Reviewed: Patient has valid H&P within 30 days. I have reviewed and there are no changes.  Airway Anatomy : Class II  Heart : Normal  Lungs : Normal  LOC/Mental Status : Normal    OTHER FINDINGS       SEDATION RISK ASSESSMENT  Risk Status ASA: Class II - Normal patient with mild systemic disease  Plan for Sedation: Moderate Sedation  Indications for Procedure/Pre-Procedure Diagnosis and Planned Procedure: anxiety    NARRATIVE FINDINGS     
Skin normal color for race, warm, dry and intact. No evidence of rash.

## 2022-04-27 NOTE — H&P ADULT. - PROBLEM SELECTOR PLAN 1
no
Patient had Rt hip pain,  3D CT pelvis showed Subacute to chronic displaced fracture of the right femoral neck with surrounding heterotopic bone formation. CT head -ve for acute bleed.   Ortho Dr. Young already assessed the patient and note says R hemiarthroplasty in AM and patient is NPO after midnight.   NPO after midnight  Patient medical cleared with moderate risk pending echo results as per Dr Massey.  Follow Echo results  Tylenol for pain control.

## 2022-06-16 NOTE — ED PROVIDER NOTE - DURATION
yesterday Azathioprine Pregnancy And Lactation Text: This medication is Pregnancy Category D and isn't considered safe during pregnancy. It is unknown if this medication is excreted in breast milk.

## 2023-08-02 NOTE — DISCHARGE NOTE ADULT - PLAN OF CARE
Patient : Veena Snow Age: 84 year old Sex: female   MRN: [id number] Encounter Date: 8/2/2023      History     Chief Complaint   Patient presents with   • Leg Pain     Patient presents for leg pain.  She has a chronic venous stasis ulcer on the medial aspect of her right ankle.  This is taken some time to heal.  Earlier in July she had a bout of cellulitis, completed a course of Bactrim and has been seeing wound care although she missed her last follow-up appointment.  She was seen here recently by Dr. Donald and that note was reviewed.  She is also referred to a vascular surgeon, her appointment for that is tomorrow.  She also had an ultrasound examination which was negative for DVT  The daughter is concerned that the patient is having increasing pain in the right leg.  They have also noticed some increased color change to the leg from the knee down on the right side.  The wound itself appears to be healing, they have not noticed any discharge or swelling.  No systemic symptoms of fevers or chills, lightheadedness or dizziness.          Allergies   Allergen Reactions   • Bandage [Adhesive   (Environmental)] RASH and PRURITUS   • Valium [Diazepam] Other (See Comments)     Hard to awaken, hyperactive   • Latex   (Environmental) RASH and PRURITUS       Current Discharge Medication List      Prior to Admission Medications    Details   sulfamethoxazole-trimethoprim (Bactrim DS) 800-160 MG per tablet Take 1 tablet by mouth in the morning and 1 tablet in the evening. Do all this for 10 days.  Qty: 20 tablet, Refills: 0      mupirocin (BACTROBAN) 2 % ointment Apply topically 2 times daily for 10 days.  Qty: 22 g, Refills: 0      amLODIPine (NORVASC) 5 MG tablet Take 1 tablet by mouth daily.  Qty: 30 tablet, Refills: 1      Blood Glucose Monitoring Suppl (Accu-Chek Anu Plus) w/Device Kit Use for testing blood sugar at least once daily  Qty: 1 kit, Refills: 0      Accu-Chek FastClix Lancets Misc Test sugar daily. DX:  Diabetes  Qty: 100 each, Refills: 11      blood glucose test strip Test blood sugar once daily  Qty: 100 each, Refills: 12      Continuous Blood Gluc Sensor (FreeStyle Fabian 2 Sensor) Misc 1 each every 14 days.  Qty: 6 each, Refills: 3      Continuous Blood Gluc  (FreeStyle Fabian 2 [place]) Device USe to read blood sugars twice daily  Qty: 1 each, Refills: 0      lisinopril (ZESTRIL) 20 MG tablet Take 1 tablet by mouth daily.  Qty: 30 tablet, Refills: 11      metFORMIN (GLUCOPHAGE-XR) 750 MG 24 hr tablet Take 2 tablets by mouth daily. Needs an appointment and labs  Qty: 60 tablet, Refills: 11      simvastatin (ZOCOR) 20 MG tablet Take 1 tablet by mouth nightly.  Qty: 90 tablet, Refills: 3      Insulin Pen Needle (Advocate Insulin Pen Needles) 31G X 5 MM Misc Use to inject trulicity  Once weekly Remove needle cover(s) to expose needle before injecting.  Qty: 100 each, Refills: 1      linaGLIPtin (TRADJENTA) 5 MG tablet Take 1 tablet by mouth daily.  Qty: 30 tablet, Refills: 5      Apoaequorin (PREVAGEN PO)       vitamin D, Cholecalciferol, 25 mcg (1,000 units) capsule Take by mouth daily.  Qty: 60 capsule      B Complex-C (Super B-Complex + Vitamin C) Tab Qty:        blood glucose test strip TEST ONCE DAILY AS DIRECTED  Qty: 100 each, Refills: 1      aspirin 81 MG tablet Take 1 tablet by mouth daily.  Qty: 30 tablet, Refills: 0             Past Medical History:   Diagnosis Date   • Allergic rhinitis, cause unspecified 4/26/2012   • Anemia    • Arthritis    • Bilateral cataracts    • Blood transfusion without reported diagnosis    • Breast cancer (CMD)     right   • CKD (chronic kidney disease), stage II 11/11/2015   • CKD (chronic kidney disease), stage II 11/11/2015   • Clotting disorder (CMD)    • Depressive disorder    • Diverticulosis    • DM (diabetes mellitus) (CMD)    • DVT (deep venous thrombosis) (CMD)    • Glaucoma 4/26/2012   • HLD (hyperlipidemia)    • HTN (hypertension)    • Osteoporosis     PT.  REPORTED BUT UNTREATED   • Peripheral neuropathy    • Radial styloid tenosynovitis of left hand 3/16/2018    Left    • Uterine fibroid 2012       Past Surgical History:   Procedure Laterality Date   • APPENDECTOMY     • BREAST SURGERY      LYMPH NODES REMOVED FOR BREAST CANCER   • CYST REMOVAL      PT. STATES CYST WAS REMOVED FROM THE LOWER PART OF HER BACK   • DEXA BONE DENSITY AXIAL SKELETON  08/15/2011   • HYSTERECTOMY       PT. REPORTED AGE AND DATE UNKNOWN/DUE TO FIBROID TUMORS   • LEG SURGERY      PT. REPORTED DATE UNKNOWN       Family History   Problem Relation Age of Onset   • Cancer Mother 74        pancreas    • Diabetes Mother    • Dementia/Alzheimers Mother    • Alcohol Abuse Sister         RECOVERING ALCOHOLIC/  IN CAR ACCIDENT   • Diabetes Daughter        Social History     Tobacco Use   • Smoking status: Never   • Smokeless tobacco: Never   Vaping Use   • Vaping Use: never used   Substance Use Topics   • Alcohol use: Not Currently   • Drug use: No       E-cigarette/Vaping   • E-Cigarette/Vaping Use Never Used    • Passive Exposure No    • Counseling Given No      E-Cigarette/Vaping Substances & Devices   • Nicotine No    • THC No    • CBD No    • Flavoring No    • Disposable No    • Pre-filled or Refillable Cartridge No    • Refillable Tank No    • Pre-filled Pod No        Review of Systems   Constitutional: Negative for activity change, appetite change, chills, fatigue and fever.   HENT: Negative for congestion, rhinorrhea, sinus pressure and sore throat.    Eyes: Negative for photophobia and visual disturbance.   Respiratory: Negative for chest tightness and shortness of breath.    Cardiovascular: Negative for chest pain and palpitations.   Gastrointestinal: Negative for abdominal pain, diarrhea, nausea and vomiting.   Genitourinary: Negative for decreased urine volume, difficulty urinating, dysuria, flank pain and urgency.   Musculoskeletal: Positive for arthralgias. Negative for back  pain, myalgias, neck pain and neck stiffness.   Skin: Negative for color change, pallor, rash and wound.   Neurological: Negative for dizziness, syncope, weakness, light-headedness, numbness and headaches.   Hematological: Negative for adenopathy. Does not bruise/bleed easily.   Psychiatric/Behavioral: Negative for confusion. The patient is not nervous/anxious.        Physical Exam     ED Triage Vitals [08/02/23 0814]   ED Triage Vitals Group      Temp 97.7 °F (36.5 °C)      Heart Rate 77      Resp 16      /62      SpO2 93 %      EtCO2 mmHg       Height 5' 5\" (1.651 m)      Weight 110 lb 3.7 oz (50 kg)      Weight Scale Used Standing scale      BMI (Calculated) 18.34      IBW/kg (Calculated) 57       Physical Exam  Vitals and nursing note reviewed.   Constitutional:       Appearance: Normal appearance. She is well-developed. She is not toxic-appearing or diaphoretic.   HENT:      Head: Normocephalic and atraumatic.      Nose: Nose normal.      Mouth/Throat:      Mouth: Mucous membranes are not dry.      Pharynx: Uvula midline. No posterior oropharyngeal erythema.   Eyes:      General: No scleral icterus.        Right eye: No discharge.         Left eye: No discharge.      Conjunctiva/sclera: Conjunctivae normal.      Pupils: Pupils are equal, round, and reactive to light.   Neck:      Vascular: No JVD.      Trachea: Trachea normal. No tracheal deviation.   Cardiovascular:      Rate and Rhythm: Normal rate and regular rhythm.      Pulses:           Femoral pulses are 2+ on the right side and 2+ on the left side.       Popliteal pulses are 1+ on the right side and 1+ on the left side.        Dorsalis pedis pulses are 0 on the right side and 0 on the left side.        Posterior tibial pulses are 0 on the right side and 1+ on the left side.      Heart sounds: Normal heart sounds.   Pulmonary:      Effort: Pulmonary effort is normal. No tachypnea, accessory muscle usage or respiratory distress.      Breath sounds:  Normal breath sounds. No stridor. No wheezing or rales.   Chest:      Chest wall: No tenderness.   Abdominal:      General: Bowel sounds are normal. There is no distension.      Palpations: Abdomen is soft.      Tenderness: There is no abdominal tenderness. There is no right CVA tenderness, left CVA tenderness, guarding or rebound.   Musculoskeletal:         General: No tenderness. Normal range of motion.      Cervical back: Full passive range of motion without pain, normal range of motion and neck supple.   Skin:     General: Skin is warm and dry.      Coloration: Skin is not pale.      Findings: No ecchymosis, erythema or rash.   Neurological:      General: No focal deficit present.      Mental Status: She is alert and oriented to person, place, and time. She is not disoriented.         ED Course     Procedures    Lab Results     Results for orders placed or performed during the hospital encounter of 08/02/23   ISTAT8 VENOUS  POINT OF CARE   Result Value Ref Range    BUN - POINT OF CARE 24 (H) 6 - 20 mg/dL    SODIUM - POINT OF CARE 138 135 - 145 mmol/L    POTASSIUM - POINT OF CARE 5.0 3.4 - 5.1 mmol/L    CHLORIDE - POINT OF CARE 101 97 - 110 mmol/L    TCO2 - POINT OF CARE 27 (H) 19 - 24 mmol/L    ANION GAP - POINT OF CARE 16 7 - 19 mmol/L    HEMATOCRIT - POINT OF CARE 42.0 36.0 - 46.5 %    HEMOGLOBIN - POINT OF CARE 14.3 12.0 - 15.5 g/dL    GLUCOSE - POINT OF CARE 174 (H) 70 - 99 mg/dL    CALCIUM, IONIZED - POINT OF CARE 1.18 1.15 - 1.29 mmol/L    Creatinine 1.60 (H) 0.51 - 0.95 mg/dL    Glomerular Filtration Rate 32 (L) >=60   Labs reviewed and independently interpreted, abnormalities as above.        Radiology Results     Imaging Results          CTA ABDOMINAL AORTA ILIOFEMORAL BILATERAL RUNOFF (Final result)  Result time 08/02/23 11:10:52    Final result                 Impression:    IMPRESSION:    1.   Right: SFA multifocal calcified plaques with up to moderate stenosis. At least moderate stenosis of the  popliteal artery. Anterior tibial artery multifocal high-grade stenoses or occlusions.  2.   Left: SFA at least mild stenosis. Midportion of the peroneal artery is occluded. Distal portion of the posterior tibial artery is nonopacified. Medial plantar artery is not opacified with contrast.      Electronically Signed by: Bon De La Paz MD   Signed on: 8/2/2023 11:10 AM   Workstation ID: [id number]             Narrative:    CTA ABDOMINAL AORTA ILIOFEMORAL BILATERAL RUNOFF    HISTORY: Claudication or leg ischemia    COMPARISON: None.    TECHNIQUE: Helical CT angiogram of the abdomen and pelvis, and lower extremities was performed with contrast per run-off protocol. Triplanar, 3D, and MIP reconstructions were reviewed. 150 mL Omnipaque 350 was administered.    FINDINGS:    Abdominal aorta: Patent.  Celiac artery: At least moderate stenosis at the origin.  SMA: Patent.  BRENDA: Patent.  Renal arteries: At least moderate stenosis proximally.    RIGHT  Common Iliac: Significant calcification with mild-moderate stenosis.  External Iliac: Patent.  Internal Iliac: Occluded.    Common Femoral: Patent.  Profunda Femoral: Patent.  Superficial Femoral: Multifocal calcific plaques with up to moderate stenosis.  Popliteal: At least moderate stenosis.    3-vessel runoff to the foot.  Tibioperoneal Trunk: Patent.  Peroneal: Patent.  Posterior Tibial: Patent.  Anterior Tibial: Calcified. Multifocal high-grade stenoses or occlusions.    Dorsalis Pedis: Patent.  Medial Plantar: Patent.    LEFT    Common Iliac: Patent.  External Iliac: Patent.  Internal Iliac: Patent.      Common Femoral: Patent.  Profunda Femoral: Patent.  Superficial Femoral: Multifocal at least mild stenosis because of calcified plaques  Popliteal: Patent.    1-vessel runoff to the foot.  Tibioperoneal Trunk: Patent.  Peroneal: Midportion is occluded.  Posterior Tibial: Distally not opacified with contrast.  Anterior Tibial: Patent.    Dorsalis Pedis: Patent.  Medial  Plantar: Not opacified with contrast.  ft    NON-VASCULAR  Ascites.  Renal cysts.                            CT was reviewed, radiologist report as above    ED Medication Orders (From admission, onward)    None               MDM  Unable to find a Doppler pulse on the right dorsalis pedis.  This likely explains her worsening pain and skin changes.  I suspect she has vascular stenosis in this right leg of her chronic in nature.  She likely has an element of neuropathy as well from her diabetes.  Given the increasing pain, planned CTA with runoff of the leg to evaluate prior to vascular surgery follow-up  1130 Dr. Sánchez was paged and his PA is at the bedside to evaluate the patient  1230 Patient seen by Dr. Sánchez, recommends IV hydration and follow up with wound care.  Clinical Impression     ED Diagnosis   1. Renal insufficiency        2. Peripheral vascular disease (CMD)            Disposition        Discharge after Treatment 8/2/2023 12:43 PM  There is no comment       Blake Daniel MD  08/02/23 6739     s/p R Hip Hemiarthroplasty on 1/24/17 by Dr. Uriostegui discharge to rehab, continue PT, pain management, DVT ppx with lovenox  until pt. more ambulatory. F/u Dr. Moody Vargas in 1 week. discontinue remeron and depakene, continue namenda. Haldol as needed for agitation.